# Patient Record
Sex: MALE | Race: WHITE | NOT HISPANIC OR LATINO | Employment: UNEMPLOYED | URBAN - METROPOLITAN AREA
[De-identification: names, ages, dates, MRNs, and addresses within clinical notes are randomized per-mention and may not be internally consistent; named-entity substitution may affect disease eponyms.]

---

## 2023-12-13 ENCOUNTER — APPOINTMENT (OUTPATIENT)
Dept: RADIOLOGY | Facility: HOSPITAL | Age: 58
DRG: 101 | End: 2023-12-13

## 2023-12-13 ENCOUNTER — HOSPITAL ENCOUNTER (INPATIENT)
Facility: HOSPITAL | Age: 58
LOS: 1 days | Discharge: HOME | DRG: 101 | End: 2023-12-15
Attending: EMERGENCY MEDICINE | Admitting: INTERNAL MEDICINE

## 2023-12-13 DIAGNOSIS — Z23 TETANUS TOXOID VACCINATION ADMINISTERED AT CURRENT VISIT: ICD-10-CM

## 2023-12-13 DIAGNOSIS — S09.93XA FACIAL INJURY, INITIAL ENCOUNTER: ICD-10-CM

## 2023-12-13 DIAGNOSIS — R56.9 SEIZURE (MULTI): Primary | ICD-10-CM

## 2023-12-13 DIAGNOSIS — R41.82 ALTERED MENTAL STATUS, UNSPECIFIED ALTERED MENTAL STATUS TYPE: ICD-10-CM

## 2023-12-13 DIAGNOSIS — S09.90XA INJURY OF HEAD, INITIAL ENCOUNTER: ICD-10-CM

## 2023-12-13 LAB
ALBUMIN SERPL-MCNC: 4 G/DL (ref 3.5–5)
ALP BLD-CCNC: 112 U/L (ref 35–125)
ALT SERPL-CCNC: 12 U/L (ref 5–40)
AMMONIA PLAS-SCNC: 69 UMOL/L (ref 12–45)
AMPHETAMINES UR QL SCN>1000 NG/ML: NEGATIVE
ANION GAP SERPL CALC-SCNC: 19 MMOL/L
APAP SERPL-MCNC: <5 UG/ML
APPEARANCE UR: CLEAR
AST SERPL-CCNC: 15 U/L (ref 5–40)
BARBITURATES UR QL SCN>300 NG/ML: NEGATIVE
BASOPHILS # BLD AUTO: 0.05 X10*3/UL (ref 0–0.1)
BASOPHILS NFR BLD AUTO: 0.5 %
BENZODIAZ UR QL SCN>300 NG/ML: NEGATIVE
BILIRUB SERPL-MCNC: <0.2 MG/DL (ref 0.1–1.2)
BILIRUB UR STRIP.AUTO-MCNC: NEGATIVE MG/DL
BUN SERPL-MCNC: 12 MG/DL (ref 8–25)
BZE UR QL SCN>300 NG/ML: NEGATIVE
CALCIUM SERPL-MCNC: 9.3 MG/DL (ref 8.5–10.4)
CANNABINOIDS UR QL SCN>50 NG/ML: POSITIVE
CHLORIDE SERPL-SCNC: 100 MMOL/L (ref 97–107)
CK SERPL-CCNC: 118 U/L (ref 24–195)
CO2 SERPL-SCNC: 18 MMOL/L (ref 24–31)
COLOR UR: ABNORMAL
CREAT SERPL-MCNC: 1.2 MG/DL (ref 0.4–1.6)
EOSINOPHIL # BLD AUTO: 0.23 X10*3/UL (ref 0–0.7)
EOSINOPHIL NFR BLD AUTO: 2.4 %
ERYTHROCYTE [DISTWIDTH] IN BLOOD BY AUTOMATED COUNT: 15.2 % (ref 11.5–14.5)
ETHANOL SERPL-MCNC: <0.01 G/DL
FENTANYL+NORFENTANYL UR QL SCN: NEGATIVE
GFR SERPL CREATININE-BSD FRML MDRD: 70 ML/MIN/1.73M*2
GLUCOSE BLD MANUAL STRIP-MCNC: 127 MG/DL (ref 74–99)
GLUCOSE BLD MANUAL STRIP-MCNC: 142 MG/DL (ref 74–99)
GLUCOSE SERPL-MCNC: 189 MG/DL (ref 65–99)
GLUCOSE UR STRIP.AUTO-MCNC: ABNORMAL MG/DL
HCT VFR BLD AUTO: 45.8 % (ref 41–52)
HGB BLD-MCNC: 14.6 G/DL (ref 13.5–17.5)
HOLD SPECIMEN: NORMAL
HYALINE CASTS #/AREA URNS AUTO: ABNORMAL /LPF
IMM GRANULOCYTES # BLD AUTO: 0.04 X10*3/UL (ref 0–0.7)
IMM GRANULOCYTES NFR BLD AUTO: 0.4 % (ref 0–0.9)
KETONES UR STRIP.AUTO-MCNC: NEGATIVE MG/DL
LEUKOCYTE ESTERASE UR QL STRIP.AUTO: NEGATIVE
LYMPHOCYTES # BLD AUTO: 3.31 X10*3/UL (ref 1.2–4.8)
LYMPHOCYTES NFR BLD AUTO: 34.2 %
MCH RBC QN AUTO: 27.9 PG (ref 26–34)
MCHC RBC AUTO-ENTMCNC: 31.9 G/DL (ref 32–36)
MCV RBC AUTO: 88 FL (ref 80–100)
METHADONE UR QL SCN>300 NG/ML: NEGATIVE
MONOCYTES # BLD AUTO: 0.57 X10*3/UL (ref 0.1–1)
MONOCYTES NFR BLD AUTO: 5.9 %
MUCOUS THREADS #/AREA URNS AUTO: ABNORMAL /LPF
NEUTROPHILS # BLD AUTO: 5.48 X10*3/UL (ref 1.2–7.7)
NEUTROPHILS NFR BLD AUTO: 56.6 %
NITRITE UR QL STRIP.AUTO: NEGATIVE
NRBC BLD-RTO: 0 /100 WBCS (ref 0–0)
OPIATES UR QL SCN>300 NG/ML: NEGATIVE
OXYCODONE UR QL: NEGATIVE
PCP UR QL SCN>25 NG/ML: NEGATIVE
PH UR STRIP.AUTO: 6 [PH]
PLATELET # BLD AUTO: 309 X10*3/UL (ref 150–450)
POTASSIUM SERPL-SCNC: 4.5 MMOL/L (ref 3.4–5.1)
PROLACTIN SERPL-MCNC: 11.3 UG/L (ref 2–18)
PROT SERPL-MCNC: 7.1 G/DL (ref 5.9–7.9)
PROT UR STRIP.AUTO-MCNC: ABNORMAL MG/DL
RBC # BLD AUTO: 5.23 X10*6/UL (ref 4.5–5.9)
RBC # UR STRIP.AUTO: ABNORMAL /UL
RBC #/AREA URNS AUTO: ABNORMAL /HPF
SALICYLATES SERPL-MCNC: <0 MG/DL
SODIUM SERPL-SCNC: 137 MMOL/L (ref 133–145)
SP GR UR STRIP.AUTO: 1.02
TROPONIN T SERPL-MCNC: 9 NG/L
UROBILINOGEN UR STRIP.AUTO-MCNC: NORMAL MG/DL
WBC # BLD AUTO: 9.7 X10*3/UL (ref 4.4–11.3)
WBC #/AREA URNS AUTO: ABNORMAL /HPF

## 2023-12-13 PROCEDURE — S4991 NICOTINE PATCH NONLEGEND: HCPCS | Performed by: NURSE PRACTITIONER

## 2023-12-13 PROCEDURE — 80320 DRUG SCREEN QUANTALCOHOLS: CPT | Performed by: EMERGENCY MEDICINE

## 2023-12-13 PROCEDURE — 80143 DRUG ASSAY ACETAMINOPHEN: CPT | Performed by: EMERGENCY MEDICINE

## 2023-12-13 PROCEDURE — 2500000004 HC RX 250 GENERAL PHARMACY W/ HCPCS (ALT 636 FOR OP/ED): Performed by: EMERGENCY MEDICINE

## 2023-12-13 PROCEDURE — 36415 COLL VENOUS BLD VENIPUNCTURE: CPT | Performed by: EMERGENCY MEDICINE

## 2023-12-13 PROCEDURE — 82550 ASSAY OF CK (CPK): CPT | Performed by: EMERGENCY MEDICINE

## 2023-12-13 PROCEDURE — 72125 CT NECK SPINE W/O DYE: CPT

## 2023-12-13 PROCEDURE — 82947 ASSAY GLUCOSE BLOOD QUANT: CPT

## 2023-12-13 PROCEDURE — 84146 ASSAY OF PROLACTIN: CPT | Mod: WESLAB | Performed by: EMERGENCY MEDICINE

## 2023-12-13 PROCEDURE — 81001 URINALYSIS AUTO W/SCOPE: CPT | Performed by: EMERGENCY MEDICINE

## 2023-12-13 PROCEDURE — 99285 EMERGENCY DEPT VISIT HI MDM: CPT | Performed by: EMERGENCY MEDICINE

## 2023-12-13 PROCEDURE — 70486 CT MAXILLOFACIAL W/O DYE: CPT

## 2023-12-13 PROCEDURE — 82140 ASSAY OF AMMONIA: CPT | Performed by: EMERGENCY MEDICINE

## 2023-12-13 PROCEDURE — 85025 COMPLETE CBC W/AUTO DIFF WBC: CPT | Performed by: EMERGENCY MEDICINE

## 2023-12-13 PROCEDURE — G0378 HOSPITAL OBSERVATION PER HR: HCPCS

## 2023-12-13 PROCEDURE — 71045 X-RAY EXAM CHEST 1 VIEW: CPT

## 2023-12-13 PROCEDURE — 83880 ASSAY OF NATRIURETIC PEPTIDE: CPT | Mod: WESLAB | Performed by: INTERNAL MEDICINE

## 2023-12-13 PROCEDURE — 70450 CT HEAD/BRAIN W/O DYE: CPT

## 2023-12-13 PROCEDURE — 2500000004 HC RX 250 GENERAL PHARMACY W/ HCPCS (ALT 636 FOR OP/ED): Performed by: NURSE PRACTITIONER

## 2023-12-13 PROCEDURE — 84484 ASSAY OF TROPONIN QUANT: CPT

## 2023-12-13 PROCEDURE — 2500000002 HC RX 250 W HCPCS SELF ADMINISTERED DRUGS (ALT 637 FOR MEDICARE OP, ALT 636 FOR OP/ED): Performed by: NURSE PRACTITIONER

## 2023-12-13 PROCEDURE — 80053 COMPREHEN METABOLIC PANEL: CPT | Performed by: EMERGENCY MEDICINE

## 2023-12-13 PROCEDURE — 80307 DRUG TEST PRSMV CHEM ANLYZR: CPT | Performed by: EMERGENCY MEDICINE

## 2023-12-13 RX ORDER — ONDANSETRON HYDROCHLORIDE 2 MG/ML
4 INJECTION, SOLUTION INTRAVENOUS EVERY 8 HOURS PRN
Status: DISCONTINUED | OUTPATIENT
Start: 2023-12-13 | End: 2023-12-15 | Stop reason: HOSPADM

## 2023-12-13 RX ORDER — ACETAMINOPHEN 650 MG/1
650 SUPPOSITORY RECTAL EVERY 4 HOURS PRN
Status: DISCONTINUED | OUTPATIENT
Start: 2023-12-13 | End: 2023-12-15 | Stop reason: HOSPADM

## 2023-12-13 RX ORDER — LEVETIRACETAM 10 MG/ML
1000 INJECTION INTRAVASCULAR EVERY 12 HOURS
Status: DISCONTINUED | OUTPATIENT
Start: 2023-12-13 | End: 2023-12-15

## 2023-12-13 RX ORDER — IPRATROPIUM BROMIDE AND ALBUTEROL SULFATE 2.5; .5 MG/3ML; MG/3ML
3 SOLUTION RESPIRATORY (INHALATION) 2 TIMES DAILY PRN
Status: DISCONTINUED | OUTPATIENT
Start: 2023-12-13 | End: 2023-12-15 | Stop reason: HOSPADM

## 2023-12-13 RX ORDER — LORAZEPAM 2 MG/ML
2 INJECTION INTRAMUSCULAR EVERY 4 HOURS PRN
Status: DISCONTINUED | OUTPATIENT
Start: 2023-12-13 | End: 2023-12-15 | Stop reason: HOSPADM

## 2023-12-13 RX ORDER — ONDANSETRON 4 MG/1
4 TABLET, FILM COATED ORAL EVERY 8 HOURS PRN
Status: DISCONTINUED | OUTPATIENT
Start: 2023-12-13 | End: 2023-12-15 | Stop reason: HOSPADM

## 2023-12-13 RX ORDER — LORAZEPAM 2 MG/ML
2 INJECTION INTRAMUSCULAR ONCE
Status: COMPLETED | OUTPATIENT
Start: 2023-12-13 | End: 2023-12-13

## 2023-12-13 RX ORDER — BUDESONIDE 0.5 MG/2ML
0.5 INHALANT ORAL
Status: DISCONTINUED | OUTPATIENT
Start: 2023-12-13 | End: 2023-12-13

## 2023-12-13 RX ORDER — BISACODYL 5 MG
10 TABLET, DELAYED RELEASE (ENTERIC COATED) ORAL DAILY PRN
Status: DISCONTINUED | OUTPATIENT
Start: 2023-12-13 | End: 2023-12-15 | Stop reason: HOSPADM

## 2023-12-13 RX ORDER — ACETAMINOPHEN 160 MG/5ML
650 SOLUTION ORAL EVERY 4 HOURS PRN
Status: DISCONTINUED | OUTPATIENT
Start: 2023-12-13 | End: 2023-12-15 | Stop reason: HOSPADM

## 2023-12-13 RX ORDER — ACETAMINOPHEN 325 MG/1
650 TABLET ORAL EVERY 4 HOURS PRN
Status: DISCONTINUED | OUTPATIENT
Start: 2023-12-13 | End: 2023-12-15 | Stop reason: HOSPADM

## 2023-12-13 RX ORDER — FAMOTIDINE 20 MG/1
20 TABLET, FILM COATED ORAL 2 TIMES DAILY
COMMUNITY
Start: 2023-01-31 | End: 2023-12-13 | Stop reason: ENTERED-IN-ERROR

## 2023-12-13 RX ORDER — IBUPROFEN 200 MG
1 TABLET ORAL DAILY
Status: DISCONTINUED | OUTPATIENT
Start: 2023-12-13 | End: 2023-12-15 | Stop reason: HOSPADM

## 2023-12-13 RX ORDER — IPRATROPIUM BROMIDE AND ALBUTEROL SULFATE 2.5; .5 MG/3ML; MG/3ML
3 SOLUTION RESPIRATORY (INHALATION)
Status: DISCONTINUED | OUTPATIENT
Start: 2023-12-13 | End: 2023-12-13

## 2023-12-13 RX ORDER — ACETAMINOPHEN 325 MG/1
650 TABLET ORAL ONCE
Status: COMPLETED | OUTPATIENT
Start: 2023-12-13 | End: 2023-12-13

## 2023-12-13 RX ADMIN — LEVETIRACETAM 1000 MG: 10 INJECTION INTRAVENOUS at 16:14

## 2023-12-13 RX ADMIN — Medication 1 PATCH: at 16:14

## 2023-12-13 RX ADMIN — ACETAMINOPHEN 650 MG: 325 TABLET ORAL at 06:54

## 2023-12-13 RX ADMIN — LORAZEPAM 2 MG: 2 INJECTION, SOLUTION INTRAMUSCULAR; INTRAVENOUS at 12:49

## 2023-12-13 RX ADMIN — SODIUM CHLORIDE 500 ML: 900 INJECTION, SOLUTION INTRAVENOUS at 05:48

## 2023-12-13 ASSESSMENT — ENCOUNTER SYMPTOMS
CONSTIPATION: 0
ABDOMINAL PAIN: 0
DYSURIA: 0
DIARRHEA: 0
FATIGUE: 0
DIZZINESS: 0
SHORTNESS OF BREATH: 0
ABDOMINAL DISTENTION: 0
LIGHT-HEADEDNESS: 0
APPETITE CHANGE: 0
RHINORRHEA: 0
CHEST TIGHTNESS: 0
PALPITATIONS: 0
FEVER: 0
NUMBNESS: 0
VOMITING: 0
COUGH: 0
NAUSEA: 0

## 2023-12-13 ASSESSMENT — LIFESTYLE VARIABLES
REASON UNABLE TO ASSESS: NO
EVER FELT BAD OR GUILTY ABOUT YOUR DRINKING: NO
HAVE YOU EVER FELT YOU SHOULD CUT DOWN ON YOUR DRINKING: NO
EVER HAD A DRINK FIRST THING IN THE MORNING TO STEADY YOUR NERVES TO GET RID OF A HANGOVER: NO
HAVE PEOPLE ANNOYED YOU BY CRITICIZING YOUR DRINKING: NO

## 2023-12-13 ASSESSMENT — PAIN SCALES - GENERAL
PAINLEVEL_OUTOF10: 0 - NO PAIN
PAINLEVEL_OUTOF10: 0 - NO PAIN

## 2023-12-13 ASSESSMENT — COLUMBIA-SUICIDE SEVERITY RATING SCALE - C-SSRS
1. IN THE PAST MONTH, HAVE YOU WISHED YOU WERE DEAD OR WISHED YOU COULD GO TO SLEEP AND NOT WAKE UP?: NO
6. HAVE YOU EVER DONE ANYTHING, STARTED TO DO ANYTHING, OR PREPARED TO DO ANYTHING TO END YOUR LIFE?: NO
2. HAVE YOU ACTUALLY HAD ANY THOUGHTS OF KILLING YOURSELF?: NO

## 2023-12-13 ASSESSMENT — PAIN DESCRIPTION - PROGRESSION: CLINICAL_PROGRESSION: NOT CHANGED

## 2023-12-13 ASSESSMENT — PAIN - FUNCTIONAL ASSESSMENT: PAIN_FUNCTIONAL_ASSESSMENT: 0-10

## 2023-12-13 NOTE — ED PROVIDER NOTES
HPI   Chief Complaint   Patient presents with    Seizures     Pt presents to ED after seizure at home, roommates state that they were drinking their morning coffee when he fel onto carpet and started having full body convulsions. Pt reportedly had one seizure about a year ago, not on seizure meds. Pt hit head off carpet, has abrasions to L face from carpet, not on blood thinners. Pt post ictal on arrival, A&Ox1-2.        58-year-old male with no significant past medical history is brought to the emergency department by EMS after an episode concerning for seizure.  The patient was getting breakfast before going to work and he had a witnessed episode where he became tonic fell forward striking his face then had clonic activity for an unknown known amount of time and then was difficult to arouse.  911 was called and when EMS arrived the patient was postictal and has been improving during transport.  Currently the patient is alert and oriented, but slow to respond and is not showing any signs of focal neurological deficit.  He states that he does not have a seizure history and no medications.  He denies alcohol abuse or previous withdrawal seizures.  He has not had any viral illnesses or recent travel.  Witnesses stated that he had a similar episode 1 year ago, but the patient states that nothing came of it.  He also denies any illegal drugs and smokes cigarettes.      History provided by:  Patient and EMS personnel   used: No                        Dayana Coma Scale Score: 14         NIH Stroke Scale: 1          Patient History   Past Medical History:   Diagnosis Date    Seizures (CMS/HCC)      Past Surgical History:   Procedure Laterality Date    SHOULDER SURGERY Left      No family history on file.  Social History     Tobacco Use    Smoking status: Every Day     Types: Cigarettes    Smokeless tobacco: Never   Substance Use Topics    Alcohol use: Never    Drug use: Yes     Types: Marijuana        Physical Exam   ED Triage Vitals [12/13/23 0536]   Temp Heart Rate Resp BP   36.3 °C (97.3 °F) 79 20 152/88      SpO2 Temp Source Heart Rate Source Patient Position   96 % Oral Monitor --      BP Location FiO2 (%)     -- --       Physical Exam  Vitals and nursing note reviewed.   Constitutional:       General: He is not in acute distress.     Appearance: He is well-developed and normal weight. He is ill-appearing and toxic-appearing. He is not diaphoretic.   HENT:      Head: Normocephalic.      Nose: Nose normal.      Mouth/Throat:      Mouth: Mucous membranes are moist.   Eyes:      General: No scleral icterus.     Extraocular Movements: Extraocular movements intact.      Conjunctiva/sclera: Conjunctivae normal.      Pupils: Pupils are equal, round, and reactive to light.   Cardiovascular:      Rate and Rhythm: Normal rate and regular rhythm.      Heart sounds: No murmur heard.  Pulmonary:      Effort: Pulmonary effort is normal. No respiratory distress.      Breath sounds: Normal breath sounds.   Abdominal:      Palpations: Abdomen is soft.      Tenderness: There is no abdominal tenderness.   Musculoskeletal:         General: No swelling, tenderness, deformity or signs of injury.      Cervical back: Normal range of motion and neck supple. No tenderness.   Skin:     General: Skin is warm and dry.      Capillary Refill: Capillary refill takes less than 2 seconds.   Neurological:      General: No focal deficit present.      Mental Status: He is alert.   Psychiatric:         Mood and Affect: Mood normal.         ED Course & MDM   ED Course as of 12/14/23 0712   Wed Dec 13, 2023   0549 Vital signs within normal limits and he is not febrile or hypothermic, tachycardic or tachypneic.  Blood pressure minimally elevated [EG]   0550 ECG performed on December 13, 2023 at 5:38 AM and interpreted at 5:40 AM shows NSR, NAD, intervals within normal limits, no STEMI. No previous available for comparison [EG]   0647  Ammonia(!): 69 [JJ]   0802 Labs reviewed and the patient has a mildly raised ammonia level, normal CMP without evidence of hepatitis or hyperbilirubinemia.  Prolactin is taken but will not likely result today.  Creatinine kinase is low.  This is not supporting the diagnosis of seizure.  Patient has not had any other seizure-like episodes while in the emergency department [EG]   0940 EKG on my independent review: Sinus bradycardia at 54 bpm, normal axis, first-degree AV block otherwise normal intervals, no acute ST or T wave abnormalities [SHREYAS]   0949 Nursing staff approached me with concern because patient became acutely diaphoretic.  EKG was obtained chest x-ray will be performed awaiting troponin and BNP to evaluate for any cardiac etiology. [JJ]   1104 Ammonia(!): 69 [JJ]   1206 I spoke with neurologist on-call Dr. Chavez.  We discussed the patient case.  She recommends outpatient follow-up at the [facility] or [facility] given the patient does not have insurance and to prescribe the patient levetiracetam 500 mg twice daily for 30 days with 2 refills to cover him for the time being. [JJ]   1249 Went in to speak with patient regarding his disposition and patient was unresponsive, started seizing while I was in the room.  Blood glucose 142.  Will contact hospitalist for admission. [JJ]      ED Course User Index  [EG] Roxanne Rodríguez MD  [JJ] Maile Simmons PA-C  [SHREYAS] Gabrielle Brooke MD         Diagnoses as of 12/14/23 0712   Facial injury, initial encounter   Altered mental status, unspecified altered mental status type   Seizure (CMS/HCC)   Injury of head, initial encounter   Tetanus toxoid vaccination administered at current visit       Medical Decision Making  I personally saw and evaluated the patient and performed a substantive portion of the visit including all aspects of the medical decision making. All diagnostic, treatment, and disposition decisions were made by me in conjunction with  the NURIS as noted in the medical record.      HPI:  As Above  PMHx/PSHx/Meds/Allergies/SH/FH as per nursing documentation and reviewed.  Review of systems: Total of 10 systems reviewed and otherwise negative except as noted elsewhere    DDX: As described in MDM    If performed, radiology listed above interpreted by me and confirmed by the Radiologist.  Medications administered during this visit (name and route): see MAR  Social determinants of health considered for this visit: Lives at home  If performed, EKG interpreted by me and detailed above    [facility] Summary/considerations:  58-year-old male presenting with altered mental status after a witnessed episode consistent with tonic clonic seizure with head injury.  As the patient is still slow but oriented to person place and time and has not returned to baseline with head injury he is being evaluated with a CT head, cervical spine and maxillofacial bones as there is evidence of trauma to the left side of his face.  Currently he denies any pain or other symptoms.  He denies that he has had any recent nausea vomiting diarrhea or constipation.  He denies a history of alcohol abuse.  The differential is very broad and included electrolyte disturbance, alcohol withdrawal, substance abuse, epilepsy, etc.  ECG is not showing any significant interval derangements or arrhythmias.      The patient was seen and triaged by our nursing/medic staff, their vitals were taken and the staff notes were reviewed.  If the patient arrived by an EMS squad or an outside agency, we discussed the case with transporting EMS medic, police, or other historians. My initial assessment was attention to their airway, breathing, and circulatory status.  We addressed any immediate or life threatening findings and completed a medical history and a physical exam if the patient or those legally responsible were in agreement with this.   Prior to the patient being discharged, I or my PA/NP or the nursing  staff discussed the differential, results and discharge plan with the patient and/or family/friend/caregiver if present.  I emphasized the importance of follow-up in 2-3 days unless otherwise specified.  I explained reasons for the patient to return to the Emergency Department. Additional verbal discharge instructions were also given and discussed with the patient to supplement those generated by the EMR. We also discussed medications that were prescribed (if any) including common side effects and interactions. The patient was advised to abstain from driving, operating heavy machinery or making significant decisions while taking medications such as antihistamines, benzodiazepines, opiates and muscle relaxers. All questions were addressed.  They understand return precautions and discharge instructions. The patient and/or family/friend/caregiver expressed understanding.  **Disclaimer:  This note was dictated by speech recognition technology.  Minor errors in transcription may be present.  Please contact for clarification or corrections.    In the case the patient eloped or refused treatment/admission, we offered to the best of our ability to provide care to the patient at the time of this encounter.    Amount and/or Complexity of Data Reviewed  Labs: ordered.  Radiology: ordered.  ECG/medicine tests: ordered and independent interpretation performed. Decision-making details documented in ED Course.        Procedure  Procedures     Roxanne Rodríguez MD  12/13/23 1639       Roxanne Rodríguez MD  12/14/23 3039

## 2023-12-13 NOTE — ED PROVIDER NOTES
HPI   Chief Complaint   Patient presents with    Seizures     Pt presents to ED after seizure at home, roommates state that they were drinking their morning coffee when he fel onto carpet and started having full body convulsions. Pt reportedly had one seizure about a year ago, not on seizure meds. Pt hit head off carpet, has abrasions to L face from carpet, not on blood thinners. Pt post ictal on arrival, A&Ox1-2.        Patient is a 58-year-old male accompanied by his brother presenting for evaluation of episode concerning for a seizure that occurred this morning.  Patient is postictal therefore history was provided by the brother.  Patient's brother states they live together and the patient was sitting in a chair awaiting his morning coffee when his brother noticed him slumped over and hit his face on the ground he then started jerking.  Patient does not remember the event.  Patient states he did have a seizure once before a few months ago but he does not have insurance therefore he did not follow-up with a neurologist.  He is not on any seizure medications at this time.  Patient denies recent illness, fever, chills.  Denies any other injuries.  Patient smokes but denies alcohol use.  Denies drug use.  Patient has no other acute complaints at this time.  Patient unsure of whether his tetanus shot is updated or not.                        Dayana Coma Scale Score: 14         NIH Stroke Scale: 1          Patient History   Past Medical History:   Diagnosis Date    Seizures (CMS/HCC)      Past Surgical History:   Procedure Laterality Date    SHOULDER SURGERY Left      No family history on file.  Social History     Tobacco Use    Smoking status: Every Day     Types: Cigarettes    Smokeless tobacco: Never   Substance Use Topics    Alcohol use: Never    Drug use: Yes     Types: Marijuana       Physical Exam   ED Triage Vitals [12/13/23 0536]   Temp Heart Rate Resp BP   36.3 °C (97.3 °F) 79 20 152/88      SpO2 Temp Source  Heart Rate Source Patient Position   96 % Oral Monitor --      BP Location FiO2 (%)     -- --       Physical Exam  Vitals and nursing note reviewed.   Constitutional:       Comments: Awake but slow to respond, postictal.   HENT:      Head:      Comments: Abrasions to the chin and left maxillofacial and periorbital area.  Nontender to palpation.  Cardiovascular:      Rate and Rhythm: Normal rate and regular rhythm.      Heart sounds: Normal heart sounds.   Pulmonary:      Comments: Diminished lung sounds bilaterally.  Abdominal:      General: Abdomen is flat. There is no distension.      Palpations: Abdomen is soft.      Tenderness: There is no abdominal tenderness.   Skin:     General: Skin is warm and dry.   Neurological:      Comments: Patient is oriented to person and place, slow to respond.   Psychiatric:         Mood and Affect: Mood normal.         Behavior: Behavior normal.         ED Course & MDM   ED Course as of 12/13/23 1343   Wed Dec 13, 2023   0549 Vital signs within normal limits and he is not febrile or hypothermic, tachycardic or tachypneic.  Blood pressure minimally elevated [EG]   0550 ECG performed on December 13, 2023 at 5:38 AM and interpreted at 5:40 AM shows NSR, NAD, intervals within normal limits, no STEMI. No previous available for comparison [EG]   0647 Ammonia(!): 69 [JJ]   0802 Labs reviewed and the patient has a mildly raised ammonia level, normal CMP without evidence of hepatitis or hyperbilirubinemia.  Prolactin is taken but will not likely result today.  Creatinine kinase is low.  This is not supporting the diagnosis of seizure.  Patient has not had any other seizure-like episodes while in the emergency department [EG]   0940 EKG on my independent review: Sinus bradycardia at 54 bpm, normal axis, first-degree AV block otherwise normal intervals, no acute ST or T wave abnormalities [SHREYAS]   0949 Nursing staff approached me with concern because patient became acutely diaphoretic.  EKG  was obtained chest x-ray will be performed awaiting troponin and BNP to evaluate for any cardiac etiology. [JJ]   1104 Ammonia(!): 69 [JJ]   1206 I spoke with neurologist on-call Dr. Chavez.  We discussed the patient case.  She recommends outpatient follow-up at the [facility] or [facility] given the patient does not have insurance and to prescribe the patient levetiracetam 500 mg twice daily for 30 days with 2 refills to cover him for the time being. [JJ]   1249 Went in to speak with patient regarding his disposition and patient was unresponsive, started seizing while I was in the room.  Blood glucose 142.  Will contact hospitalist for admission. [JJ]      ED Course User Index  [EG] Roxanne Rodríguez MD  [JJ] Maile Simmons PA-C  [SHREYAS] Gabrielle Brooke MD         Diagnoses as of 12/13/23 1343   Facial injury, initial encounter   Altered mental status, unspecified altered mental status type   Seizure (CMS/HCC)   Injury of head, initial encounter   Tetanus toxoid vaccination administered at current visit       Medical Decision Making  Parts of this chart have been completed using voice recognition software. Please excuse any errors of transcription.  My thought process and reason for plan has been formulated from the time that I saw the patient until the time of disposition and is not specific to one specific moment during their visit and furthermore my MDM encompasses this entire chart and not only this text box.      HPI: Detailed above.    Exam: A medically appropriate exam performed, outlined above, given the known history and presentation.    History obtained from: Patient, brother    Social Determinants of Health considered during this visit: Does not have health insurance.    Medications given during visit:  Medications   diphth,pertus(acell),tetanus (BoostRIX) 2.5-8-5 Lf-mcg-Lf/0.5mL vaccine 0.5 mL (has no administration in time range)   acetaminophen (Tylenol) tablet 650 mg (650 mg oral  Given 12/13/23 0654)   sodium chloride 0.9 % bolus 500 mL (0 mL intravenous Stopped 12/13/23 0648)   LORazepam (Ativan) injection 2 mg (2 mg intravenous Given 12/13/23 1240)        Diagnostic/tests  Labs Reviewed   CBC WITH AUTO DIFFERENTIAL - Abnormal       Result Value    WBC 9.7      nRBC 0.0      RBC 5.23      Hemoglobin 14.6      Hematocrit 45.8      MCV 88      MCH 27.9      MCHC 31.9 (*)     RDW 15.2 (*)     Platelets 309      Neutrophils % 56.6      Immature Granulocytes %, Automated 0.4      Lymphocytes % 34.2      Monocytes % 5.9      Eosinophils % 2.4      Basophils % 0.5      Neutrophils Absolute 5.48      Immature Granulocytes Absolute, Automated 0.04      Lymphocytes Absolute 3.31      Monocytes Absolute 0.57      Eosinophils Absolute 0.23      Basophils Absolute 0.05     COMPREHENSIVE METABOLIC PANEL - Abnormal    Glucose 189 (*)     Sodium 137      Potassium 4.5      Chloride 100      Bicarbonate 18 (*)     Urea Nitrogen 12      Creatinine 1.20      eGFR 70      Calcium 9.3      Albumin 4.0      Alkaline Phosphatase 112      Total Protein 7.1      AST 15      Bilirubin, Total <0.2      ALT 12      Anion Gap 19     DRUG SCREEN,URINE - Abnormal    Amphetamine Screen, Urine Negative      Barbiturate Screen, Urine Negative      Benzodiazepines Screen, Urine Negative      Cannabinoid Screen, Urine Positive (*)     Cocaine Metabolite Screen, Urine Negative      Fentanyl Screen, Urine Negative      Methadone Screen, Urine Negative      Opiate Screen, Urine Negative      Oxycodone Screen, Urine Negative      PCP Screen, Urine Negative      Narrative:     These toxicological screening tests provide unconfirmed qualitative measurements to aid in treatment and diagnosis in cases of drug use or overdose. This test is used only for medical purposes. A positive result does not indicate or measure intoxication. For specific test performance or pathologist consultation, please contact the Laboratory.    The  following threshold concentrations are used for these analyses.Values at or above the threshold concentration are reported as positive. Values below the threshold are reported as negative.    Drug /Screening Threshold                                                                                                 THC/CANNABINOIDS................50 ng/ml  METHADONE......................300 ng/ml  COCAINE METABOLITES............300 ng/ml  BENZODIAZEPINE.................300 ng/ml  PCP.............................25 ng/ml  OPIATE.........................300 ng/ml  AMPHETAMINE/ECSTASY...........1000 ng/ml  BARBITURATE....................200 ng/ml  OXYCODONE......................100 ng/ml  FENTANYL.........................5 ng/ml       AMMONIA - Abnormal    Ammonia 69 (*)    URINALYSIS WITH REFLEX MICROSCOPIC AND CULTURE - Abnormal    Color, Urine Light-Yellow      Appearance, Urine Clear      Specific Gravity, Urine 1.020      pH, Urine 6.0      Protein, Urine 20 (TRACE)      Glucose, Urine 50 (TRACE) (*)     Blood, Urine 0.06 (1+) (*)     Ketones, Urine NEGATIVE      Bilirubin, Urine NEGATIVE      Urobilinogen, Urine Normal      Nitrite, Urine NEGATIVE      Leukocyte Esterase, Urine NEGATIVE     POCT GLUCOSE - Abnormal    POCT Glucose 127 (*)    POCT GLUCOSE - Abnormal    POCT Glucose 142 (*)    URINALYSIS MICROSCOPIC WITH REFLEX CULTURE - Abnormal    WBC, Urine NONE      RBC, Urine 1-2      Mucus, Urine FEW      Hyaline Casts, Urine 1+ (*)    CREATINE KINASE - Normal    Creatine Kinase 118     PROLACTIN - Normal    Prolactin 11.3     ACUTE TOXICOLOGY PANEL, BLOOD - Normal    Acetaminophen <5.0      Salicylate  <0      Alcohol <0.010     SERIAL TROPONIN, INITIAL ([facility]) - Normal    Troponin T, High Sensitivity 9     URINALYSIS WITH REFLEX MICROSCOPIC AND CULTURE    Narrative:     The following orders were created for panel order Urinalysis with Reflex Microscopic and Culture.  Procedure                                Abnormality         Status                     ---------                               -----------         ------                     Urinalysis with Reflex M...[[id number]]  Abnormal            Final result               Extra Urine Gray Tube[[id number]]                            Final result                 Please view results for these tests on the individual orders.   EXTRA URINE GRAY TUBE    Extra Tube Hold for add-ons.     TROPONIN T SERIES, HIGH SENSITIVITY (0, 2 HR, 6 HR)    Narrative:     The following orders were created for panel order Troponin T Series, High Sensitivity (0, 2HR, 6HR).  Procedure                               Abnormality         Status                     ---------                               -----------         ------                     Serial Troponin, Initial...[[id number]]  Normal              Final result                 Please view results for these tests on the individual orders.   PROLACTIN   N-TERMINAL PROBNP      XR chest 1 view   Final Result   No evidence of acute cardiopulmonary process.             MACRO:   None        Signed by: Maxim Harmon 12/13/2023 11:17 AM   Dictation workstation:   [id number]      CT maxillofacial bones wo IV contrast   Final Result   No acute facial bone fracture visualized.        MACRO:   None        Signed by: Hernandez Hodges 12/13/2023 8:23 AM   Dictation workstation:   [id number]      CT cervical spine wo IV contrast   Final Result   1. No acute fracture or spondylolisthesis.   2. Degenerative disc disease and spondylosis.        .   .        This report is in agreement with the preliminary report issued by   [employer].        MACRO:   None.        Signed by: Declan Tellez 12/13/2023 7:58 AM   Dictation workstation:   [id number]      CT head wo IV contrast   Final Result   No acute intracranial findings.        MACRO:   None        Signed by: Declan Tellez 12/13/2023 7:44 AM   Dictation workstation:   [id number]            Considerations/further MDM:  Patient is a 58-year-old male presenting for evaluation of episode concerning for seizure that occurred this morning.  During the ER visit, the patient is alert and oriented but slow to respond, he is postictal during the visit but this has improved throughout the course.  His vital signs are within normal limits for age.  On exam, he does have abrasions to his chin and left side of his face.  Patient has no recollection of the event but brother was able to provide history as he did witness the event.  Patient did hit his face and head not on blood thinners, CT head, C-spine and maxillofacial bones were obtained to evaluate for any bleed, occult fracture.  Images were unremarkable.  Laboratory workup was unremarkable for any signs of infection, no acute alcohol or drug intoxication.  Patient did become diaphoretic and was requiring 3 L of oxygen therefore cardiac workup was initiated, chest x-ray within normal limits troponins unremarkable.  Low suspicion for ACS at this time as the patient is not complaining of chest pain or shortness of breath.  This did resolve within a couple minutes and the patient's mental status was improving.  I consulted neurology whose recommendations including started levetiracetam and follow-up outpatient with the [facility] but when I went in to update the patient on the disposition, the patient was sitting upright but unresponsive with his eyes open and he began to seize.  Ativan was administered.  Blood glucose was checked again and was 142.  Thus I believe the patient requires additional workup for evaluation of his seizures.  I spoke with hospitalist on-call Dr. Rodriguez and we discussed the patient case.  Patient will be admitted to regular nursing floor with telemetry.  The patient's brother is in the room and is updated on the plan is agreeable at this time.      Procedure  Procedures     Maile Simmons PA-C  12/13/23 1951

## 2023-12-13 NOTE — H&P
Chief complaint: Fall, seizure    History Of Present Illness  Aron Colón is a 58 y.o. male with a past medical history of tobacco use and seizure last year who presented to the emergency department with complaints of a seizure.  Patient states he remembers sitting down having a cup of coffee and waking up in the emergency department.  States that he did have a seizure about a year ago.  Per ED note, patient did not follow-up with neurology due to having no insurance.  History per the chart states that patient was getting breakfast before going to work when he had a witnessed seizure.  He became tonic and fell forward striking his face then had clonic activity for an unknown period of time.  He was difficult to arouse.  911 was called and patient was brought to the emergency department.  Patient was postictal in the ED.  Per ED RN, patient had 2 more seizures in the emergency department.  During the seizure he did desat into the 80s and was placed on supplemental oxygen.  When he is awake his oxygen is 95% on room air.  Patient denies shortness of breath, nausea, vomiting, or abdominal discomfort.  No chest pain.  Patient initially wanting to leave AMA however explained the risks associated with seizures without treatment and patient was agreeable to stay.    In the ED: Patient was given IV Ativan.  CBC and BMP were unremarkable.  His ammonia level was 69.  CT of the head, C-spine, and face were unremarkable.  UA negative.  Urine tox screen was positive for THC.  Alcohol negative.  EKG showed normal sinus rhythm.  His troponin was 9.  He was given a tetanus shot in the ED, Tylenol, and IV fluids.  Patient admitted to [facility] for further evaluation and treatment.     Past Medical History  He has a past medical history of Seizures (CMS/HCC).    Surgical History  He has a past surgical history that includes Shoulder surgery (Left).     Social History  He reports that he has been smoking cigarettes. He has  never used smokeless tobacco. He reports current drug use. Drug: Marijuana. He reports that he does not drink alcohol.    Family History  No family history on file.     Allergies  Patient has no allergy information on record.    Review of Systems   Constitutional:  Negative for appetite change, fatigue and fever.   HENT:  Negative for congestion and rhinorrhea.    Respiratory:  Negative for cough, chest tightness and shortness of breath.    Cardiovascular:  Negative for chest pain and palpitations.   Gastrointestinal:  Negative for abdominal distention, abdominal pain, constipation, diarrhea, nausea and vomiting.   Genitourinary:  Negative for dysuria and urgency.   Neurological:  Negative for dizziness, light-headedness and numbness.   All other systems reviewed and are negative.       Physical Exam  Vitals reviewed.   Constitutional:       Appearance: Normal appearance.      Comments: Drowsy, limited historian   HENT:      Head: Normocephalic.      Comments: Noted erythema, swelling, and ecchymosis to face     Nose: Nose normal.      Mouth/Throat:      Mouth: Mucous membranes are moist.   Eyes:      Extraocular Movements: Extraocular movements intact.      Conjunctiva/sclera: Conjunctivae normal.   Cardiovascular:      Rate and Rhythm: Normal rate and regular rhythm.   Pulmonary:      Effort: Pulmonary effort is normal.      Breath sounds: Wheezing present. No rhonchi or rales.   Abdominal:      General: Bowel sounds are normal.      Palpations: Abdomen is soft.      Tenderness: There is no abdominal tenderness.   Musculoskeletal:         General: Normal range of motion.      Cervical back: Normal range of motion and neck supple.   Skin:     General: Skin is warm and dry.      Capillary Refill: Capillary refill takes less than 2 seconds.   Neurological:      General: No focal deficit present.      Mental Status: He is oriented to person, place, and time.   Psychiatric:         Mood and Affect: Mood normal.          Behavior: Behavior normal.          Last Recorded Vitals  /67   Pulse 79   Temp 36.3 °C (97.3 °F) (Oral)   Resp 23   Wt 69.9 kg (154 lb 1.6 oz)   SpO2 96%     Relevant Results  Lab Results   Component Value Date    GLUCOSE 189 (H) 12/13/2023    CALCIUM 9.3 12/13/2023     12/13/2023    K 4.5 12/13/2023    CO2 18 (L) 12/13/2023     12/13/2023    BUN 12 12/13/2023    CREATININE 1.20 12/13/2023     Lab Results   Component Value Date    WBC 9.7 12/13/2023    HGB 14.6 12/13/2023    HCT 45.8 12/13/2023    MCV 88 12/13/2023     12/13/2023     XR chest 1 view    Result Date: 12/13/2023  Interpreted By:  Maxim Harmon, STUDY: XR CHEST 1 VIEW;  12/13/2023 10:11 am   INDICATION: Signs/Symptoms:seizure possible aspiration.   COMPARISON: 09/22/2023.   ACCESSION NUMBER(S): [id number]   ORDERING CLINICIAN: ABDOULAYE BATEMAN   FINDINGS:         CARDIOMEDIASTINAL SILHOUETTE: Cardiomediastinal silhouette is normal in size and configuration.   LUNGS: Lungs are clear.   ABDOMEN: No remarkable upper abdominal findings.   BONES: No acute osseous changes.       No evidence of acute cardiopulmonary process.     MACRO: None   Signed by: Maxim Harmon 12/13/2023 11:17 AM Dictation workstation:   [id number]    ECG 12 lead    Result Date: 12/13/2023  Sinus bradycardia with 1st degree AV block Otherwise normal ECG When compared with ECG of 13-DEC-2023 05:38, (unconfirmed) No significant change was found    ECG 12 lead    Result Date: 12/13/2023  Normal sinus rhythm Normal ECG No previous ECGs available    CT maxillofacial bones wo IV contrast    Result Date: 12/13/2023  Interpreted By:  Hernandez Hodges, STUDY: CT FACIAL BONES WO IV CONTRAST  12/13/2023 6:47 am   INDICATION: Signs/Symptoms: Seizure with facial trauma   COMPARISON: None.   ACCESSION NUMBER(S): [id number]   ORDERING CLINICIAN: ALISON VICENTE   TECHNIQUE: Thin cut axial CT images through the facial bones were obtained and reconstructed in the coronal   and sagittal plane.   FINDINGS: Orbits: The bony orbits are intact. The orbital contents are unremarkable.   Facial Bones: There is no displaced facial bone fracture.   Mandible/Temporomandibular Joints: Visualized portions of mandible and bilateral temporomandibular joints are intact.   Paranasal Sinuses/Mastoids: Visualized paranasal sinuses and mastoids are clear. 10.5 mm osteoma in the left ethmoid sinuses.   Soft tissues: Unremarkable.         No acute facial bone fracture visualized.   MACRO: None   Signed by: Hernandez Hodges 12/13/2023 8:23 AM Dictation workstation:   [id number]    CT cervical spine wo IV contrast    Result Date: 12/13/2023  Interpreted By:  Declan Tellez, STUDY: CT CERVICAL SPINE WO IV CONTRAST; 12/13/2023 6:47 am   INDICATION: Signs/Symptoms:neck trauma, AMS;   COMPARISON: None   ACCESSION NUMBER(S): [id number]   ORDERING CLINICIAN: ALISON VICENTE   TECHNIQUE: Contiguous axial images of the cervical spine were performed. PATIENT RADIATION EXPOSURE DATA: CTDI: DLP:   All CT examinations are performed with one or more of the following dose reduction techniques: Automated Exposure Control, adjustment of mA and/or kV according to patient size, or use of iterative reconstruction techniques.   FINDINGS: There is straightening and reversal of the normal cervical lordosis.   No acute fracture or spondylolisthesis is identified.   Multilevel disc space narrowing can be seen. Endplate osteophytosis facet arthropathy is noted.   Severe multilevel neural foraminal narrowing is present.   Calcific atherosclerosis of the carotid bulbs can be seen bilaterally.   Limited visualization of the lung apices are unremarkable.       1. No acute fracture or spondylolisthesis. 2. Degenerative disc disease and spondylosis.   . .   This report is in agreement with the preliminary report issued by [facility].   MACRO: None.   Signed by: Declan Tellez 12/13/2023 7:58 AM Dictation workstation:    [id number]    CT head wo IV contrast    Result Date: 12/13/2023  Interpreted By:  Declan Tellez, STUDY: CT HEAD WO IV CONTRAST; 12/13/2023 6:47 am   INDICATION: Signs/Symptoms:head injury after seizure;   COMPARISON: 09/22/2023   ACCESSION NUMBER(S): [id number]   ORDERING CLINICIAN: ALISON VICENTE   TECHNIQUE: Contiguous axial images were acquired from the vertex through the posterior fossa without IV contrast.   All CT examinations are performed with one or more of the following dose reduction techniques: Automated Exposure Control, adjustment of mA and/or kV according to patient size, or use of iterative reconstruction techniques.   FINDINGS: No focal mass effect or midline shift is identified. The ventricles and sulci are symmetric and appropriate for the patient's age. However, chronic involutional change of the cerebral hemispheres is noted.   The gray white matter differentiation is preserved. Nonspecific hypodensities are identified within the periventricular and subcortical white matter likely due to chronic postischemic white matter disease. Atherosclerotic calcifications are seen within the carotid siphons and vertebral arteries.   No acute intracranial hemorrhage is identified. No intra-axial or extra-axial fluid collection is seen.   Osteoma is noted within the left ethmoidal air cells. The mastoid air cells and middle ears are clear. No calvarial fracture is identified.       No acute intracranial findings.   MACRO: None   Signed by: Delcan Tellez 12/13/2023 7:44 AM Dictation workstation:   [id number]        Assessment/Plan   Principal Problem:    Seizure (CMS/HCC)    Seizures  Patient has had 3 seizures in the last 24 hours  Does have a history of a seizure about a year ago that was never worked up  CT of the head, C-spine, and face unremarkable  Talk screen positive for THC  Ammonia level 69  Seizure precautions  IV Keppra  As needed Ativan  MRI of the brain  EEG  Consult neurology,  appreciate recommendations    Fall  Secondary to seizure  Fall precautions  PT/OT    Tobacco use  Notable wheezing on exam  DuoNebs and Pulmicort  Nicotine patch  Smoking cessation    Plan  Admit to telemetry  IV Keppra  As needed Ativan  Seizure/fall precautions  MRI/EEG  Neurology consulted, appreciate recommendations  DVT risk score low, ambulate with assistance  CBC and BMP in the morning       NEWTON Corona-CNP  Seen and examined. Agree with Celeste Coleman NP

## 2023-12-14 ENCOUNTER — APPOINTMENT (OUTPATIENT)
Dept: NEUROLOGY | Facility: HOSPITAL | Age: 58
DRG: 101 | End: 2023-12-14

## 2023-12-14 LAB
ANION GAP SERPL CALC-SCNC: 12 MMOL/L
ATRIAL RATE: 54 BPM
ATRIAL RATE: 79 BPM
BUN SERPL-MCNC: 12 MG/DL (ref 8–25)
CALCIUM SERPL-MCNC: 8.9 MG/DL (ref 8.5–10.4)
CHLORIDE SERPL-SCNC: 105 MMOL/L (ref 97–107)
CO2 SERPL-SCNC: 22 MMOL/L (ref 24–31)
CREAT SERPL-MCNC: 1.1 MG/DL (ref 0.4–1.6)
ERYTHROCYTE [DISTWIDTH] IN BLOOD BY AUTOMATED COUNT: 15.3 % (ref 11.5–14.5)
GFR SERPL CREATININE-BSD FRML MDRD: 78 ML/MIN/1.73M*2
GLUCOSE SERPL-MCNC: 104 MG/DL (ref 65–99)
HCT VFR BLD AUTO: 43.4 % (ref 41–52)
HGB BLD-MCNC: 13.9 G/DL (ref 13.5–17.5)
MCH RBC QN AUTO: 27.4 PG (ref 26–34)
MCHC RBC AUTO-ENTMCNC: 32 G/DL (ref 32–36)
MCV RBC AUTO: 86 FL (ref 80–100)
NRBC BLD-RTO: 0 /100 WBCS (ref 0–0)
P AXIS: 74 DEGREES
P AXIS: 82 DEGREES
P OFFSET: 163 MS
P OFFSET: 175 MS
P ONSET: 108 MS
P ONSET: 123 MS
PLATELET # BLD AUTO: 282 X10*3/UL (ref 150–450)
POTASSIUM SERPL-SCNC: 3.6 MMOL/L (ref 3.4–5.1)
PR INTERVAL: 192 MS
PR INTERVAL: 216 MS
Q ONSET: 216 MS
Q ONSET: 219 MS
QRS COUNT: 13 BEATS
QRS COUNT: 9 BEATS
QRS DURATION: 100 MS
QRS DURATION: 98 MS
QT INTERVAL: 372 MS
QT INTERVAL: 424 MS
QTC CALCULATION(BAZETT): 402 MS
QTC CALCULATION(BAZETT): 426 MS
QTC FREDERICIA: 407 MS
QTC FREDERICIA: 409 MS
R AXIS: 82 DEGREES
R AXIS: 88 DEGREES
RBC # BLD AUTO: 5.07 X10*6/UL (ref 4.5–5.9)
SODIUM SERPL-SCNC: 139 MMOL/L (ref 133–145)
T AXIS: 59 DEGREES
T AXIS: 60 DEGREES
T OFFSET: 405 MS
T OFFSET: 428 MS
VENTRICULAR RATE: 54 BPM
VENTRICULAR RATE: 79 BPM
WBC # BLD AUTO: 10.7 X10*3/UL (ref 4.4–11.3)

## 2023-12-14 PROCEDURE — 97161 PT EVAL LOW COMPLEX 20 MIN: CPT | Mod: GP

## 2023-12-14 PROCEDURE — 97530 THERAPEUTIC ACTIVITIES: CPT | Mod: GP

## 2023-12-14 PROCEDURE — G0378 HOSPITAL OBSERVATION PER HR: HCPCS

## 2023-12-14 PROCEDURE — 95816 EEG AWAKE AND DROWSY: CPT | Performed by: PSYCHIATRY & NEUROLOGY

## 2023-12-14 PROCEDURE — 85027 COMPLETE CBC AUTOMATED: CPT | Performed by: NURSE PRACTITIONER

## 2023-12-14 PROCEDURE — 84443 ASSAY THYROID STIM HORMONE: CPT | Performed by: NURSE PRACTITIONER

## 2023-12-14 PROCEDURE — 2500000004 HC RX 250 GENERAL PHARMACY W/ HCPCS (ALT 636 FOR OP/ED): Performed by: NURSE PRACTITIONER

## 2023-12-14 PROCEDURE — 95816 EEG AWAKE AND DROWSY: CPT

## 2023-12-14 PROCEDURE — 80048 BASIC METABOLIC PNL TOTAL CA: CPT | Performed by: NURSE PRACTITIONER

## 2023-12-14 PROCEDURE — 36415 COLL VENOUS BLD VENIPUNCTURE: CPT | Performed by: NURSE PRACTITIONER

## 2023-12-14 PROCEDURE — 82607 VITAMIN B-12: CPT | Performed by: NURSE PRACTITIONER

## 2023-12-14 RX ADMIN — LEVETIRACETAM 1000 MG: 10 INJECTION INTRAVENOUS at 17:18

## 2023-12-14 RX ADMIN — LEVETIRACETAM 1000 MG: 10 INJECTION INTRAVENOUS at 04:00

## 2023-12-14 SDOH — ECONOMIC STABILITY: TRANSPORTATION INSECURITY
IN THE PAST 12 MONTHS, HAS THE LACK OF TRANSPORTATION KEPT YOU FROM MEDICAL APPOINTMENTS OR FROM GETTING MEDICATIONS?: NO

## 2023-12-14 SDOH — ECONOMIC STABILITY: HOUSING INSECURITY: IN THE LAST 12 MONTHS, HOW MANY PLACES HAVE YOU LIVED?: 1

## 2023-12-14 SDOH — SOCIAL STABILITY: SOCIAL INSECURITY: WITHIN THE LAST YEAR, HAVE YOU BEEN AFRAID OF YOUR PARTNER OR EX-PARTNER?: NO

## 2023-12-14 SDOH — HEALTH STABILITY: PHYSICAL HEALTH: ON AVERAGE, HOW MANY DAYS PER WEEK DO YOU ENGAGE IN MODERATE TO STRENUOUS EXERCISE (LIKE A BRISK WALK)?: 0 DAYS

## 2023-12-14 SDOH — SOCIAL STABILITY: SOCIAL INSECURITY: ABUSE: ADULT

## 2023-12-14 SDOH — SOCIAL STABILITY: SOCIAL INSECURITY
WITHIN THE LAST YEAR, HAVE YOU BEEN KICKED, HIT, SLAPPED, OR OTHERWISE PHYSICALLY HURT BY YOUR PARTNER OR EX-PARTNER?: NO

## 2023-12-14 SDOH — SOCIAL STABILITY: SOCIAL INSECURITY: DOES ANYONE TRY TO KEEP YOU FROM HAVING/CONTACTING OTHER FRIENDS OR DOING THINGS OUTSIDE YOUR HOME?: NO

## 2023-12-14 SDOH — HEALTH STABILITY: MENTAL HEALTH: HOW MANY STANDARD DRINKS CONTAINING ALCOHOL DO YOU HAVE ON A TYPICAL DAY?: PATIENT DOES NOT DRINK

## 2023-12-14 SDOH — ECONOMIC STABILITY: INCOME INSECURITY: IN THE PAST 12 MONTHS, HAS THE ELECTRIC, GAS, OIL, OR WATER COMPANY THREATENED TO SHUT OFF SERVICE IN YOUR HOME?: NO

## 2023-12-14 SDOH — SOCIAL STABILITY: SOCIAL NETWORK
DO YOU BELONG TO ANY CLUBS OR ORGANIZATIONS SUCH AS CHURCH GROUPS UNIONS, FRATERNAL OR ATHLETIC GROUPS, OR SCHOOL GROUPS?: NO

## 2023-12-14 SDOH — ECONOMIC STABILITY: INCOME INSECURITY: IN THE LAST 12 MONTHS, WAS THERE A TIME WHEN YOU WERE NOT ABLE TO PAY THE MORTGAGE OR RENT ON TIME?: NO

## 2023-12-14 SDOH — HEALTH STABILITY: PHYSICAL HEALTH: ON AVERAGE, HOW MANY MINUTES DO YOU ENGAGE IN EXERCISE AT THIS LEVEL?: 0 MIN

## 2023-12-14 SDOH — HEALTH STABILITY: MENTAL HEALTH: HOW OFTEN DO YOU HAVE 6 OR MORE DRINKS ON ONE OCCASION?: NEVER

## 2023-12-14 SDOH — ECONOMIC STABILITY: HOUSING INSECURITY
IN THE LAST 12 MONTHS, WAS THERE A TIME WHEN YOU DID NOT HAVE A STEADY PLACE TO SLEEP OR SLEPT IN A SHELTER (INCLUDING NOW)?: NO

## 2023-12-14 SDOH — SOCIAL STABILITY: SOCIAL NETWORK: HOW OFTEN DO YOU ATTEND CHURCH OR RELIGIOUS SERVICES?: NEVER

## 2023-12-14 SDOH — ECONOMIC STABILITY: FOOD INSECURITY: WITHIN THE PAST 12 MONTHS, YOU WORRIED THAT YOUR FOOD WOULD RUN OUT BEFORE YOU GOT MONEY TO BUY MORE.: NEVER TRUE

## 2023-12-14 SDOH — SOCIAL STABILITY: SOCIAL INSECURITY: WITHIN THE LAST YEAR, HAVE YOU BEEN HUMILIATED OR EMOTIONALLY ABUSED IN OTHER WAYS BY YOUR PARTNER OR EX-PARTNER?: NO

## 2023-12-14 SDOH — SOCIAL STABILITY: SOCIAL INSECURITY
WITHIN THE LAST YEAR, HAVE TO BEEN RAPED OR FORCED TO HAVE ANY KIND OF SEXUAL ACTIVITY BY YOUR PARTNER OR EX-PARTNER?: NO

## 2023-12-14 SDOH — SOCIAL STABILITY: SOCIAL NETWORK: HOW OFTEN DO YOU GET TOGETHER WITH FRIENDS OR RELATIVES?: NEVER

## 2023-12-14 SDOH — HEALTH STABILITY: MENTAL HEALTH: HOW OFTEN DO YOU HAVE A DRINK CONTAINING ALCOHOL?: NEVER

## 2023-12-14 SDOH — SOCIAL STABILITY: SOCIAL INSECURITY: ARE THERE ANY APPARENT SIGNS OF INJURIES/BEHAVIORS THAT COULD BE RELATED TO ABUSE/NEGLECT?: NO

## 2023-12-14 SDOH — SOCIAL STABILITY: SOCIAL NETWORK: IN A TYPICAL WEEK, HOW MANY TIMES DO YOU TALK ON THE PHONE WITH FAMILY, FRIENDS, OR NEIGHBORS?: NEVER

## 2023-12-14 SDOH — SOCIAL STABILITY: SOCIAL NETWORK: ARE YOU MARRIED, WIDOWED, DIVORCED, SEPARATED, NEVER MARRIED, OR LIVING WITH A PARTNER?: DIVORCED

## 2023-12-14 SDOH — SOCIAL STABILITY: SOCIAL INSECURITY: HAS ANYONE EVER THREATENED TO HURT YOUR FAMILY OR YOUR PETS?: NO

## 2023-12-14 SDOH — SOCIAL STABILITY: SOCIAL INSECURITY: DO YOU FEEL UNSAFE GOING BACK TO THE PLACE WHERE YOU ARE LIVING?: NO

## 2023-12-14 SDOH — SOCIAL STABILITY: SOCIAL INSECURITY: DO YOU FEEL ANYONE HAS EXPLOITED OR TAKEN ADVANTAGE OF YOU FINANCIALLY OR OF YOUR PERSONAL PROPERTY?: NO

## 2023-12-14 SDOH — HEALTH STABILITY: MENTAL HEALTH
STRESS IS WHEN SOMEONE FEELS TENSE, NERVOUS, ANXIOUS, OR CAN'T SLEEP AT NIGHT BECAUSE THEIR MIND IS TROUBLED. HOW STRESSED ARE YOU?: TO SOME EXTENT

## 2023-12-14 SDOH — SOCIAL STABILITY: SOCIAL INSECURITY: HAVE YOU HAD THOUGHTS OF HARMING ANYONE ELSE?: NO

## 2023-12-14 SDOH — SOCIAL STABILITY: SOCIAL INSECURITY: ARE YOU OR HAVE YOU BEEN THREATENED OR ABUSED PHYSICALLY, EMOTIONALLY, OR SEXUALLY BY ANYONE?: NO

## 2023-12-14 SDOH — ECONOMIC STABILITY: TRANSPORTATION INSECURITY
IN THE PAST 12 MONTHS, HAS LACK OF TRANSPORTATION KEPT YOU FROM MEETINGS, WORK, OR FROM GETTING THINGS NEEDED FOR DAILY LIVING?: NO

## 2023-12-14 SDOH — ECONOMIC STABILITY: FOOD INSECURITY: WITHIN THE PAST 12 MONTHS, THE FOOD YOU BOUGHT JUST DIDN'T LAST AND YOU DIDN'T HAVE MONEY TO GET MORE.: NEVER TRUE

## 2023-12-14 SDOH — SOCIAL STABILITY: SOCIAL NETWORK: HOW OFTEN DO YOU ATTENT MEETINGS OF THE CLUB OR ORGANIZATION YOU BELONG TO?: NEVER

## 2023-12-14 SDOH — SOCIAL STABILITY: SOCIAL INSECURITY: WERE YOU ABLE TO COMPLETE ALL THE BEHAVIORAL HEALTH SCREENINGS?: YES

## 2023-12-14 SDOH — ECONOMIC STABILITY: INCOME INSECURITY: HOW HARD IS IT FOR YOU TO PAY FOR THE VERY BASICS LIKE FOOD, HOUSING, MEDICAL CARE, AND HEATING?: NOT HARD AT ALL

## 2023-12-14 ASSESSMENT — COGNITIVE AND FUNCTIONAL STATUS - GENERAL
MOBILITY SCORE: 24
DAILY ACTIVITIY SCORE: 24
MOBILITY SCORE: 21
MOBILITY SCORE: 20
MOVING TO AND FROM BED TO CHAIR: A LITTLE
PATIENT BASELINE BEDBOUND: NO
CLIMB 3 TO 5 STEPS WITH RAILING: A LITTLE
WALKING IN HOSPITAL ROOM: A LITTLE
DAILY ACTIVITIY SCORE: 24
STANDING UP FROM CHAIR USING ARMS: A LITTLE
MOVING TO AND FROM BED TO CHAIR: A LITTLE
WALKING IN HOSPITAL ROOM: A LITTLE
CLIMB 3 TO 5 STEPS WITH RAILING: A LITTLE

## 2023-12-14 ASSESSMENT — LIFESTYLE VARIABLES
HOW MANY STANDARD DRINKS CONTAINING ALCOHOL DO YOU HAVE ON A TYPICAL DAY: PATIENT DOES NOT DRINK
AUDIT-C TOTAL SCORE: 0
HOW OFTEN DO YOU HAVE 6 OR MORE DRINKS ON ONE OCCASION: NEVER
SKIP TO QUESTIONS 9-10: 1
HOW OFTEN DO YOU HAVE A DRINK CONTAINING ALCOHOL: NEVER
AUDIT-C TOTAL SCORE: 0
AUDIT-C TOTAL SCORE: 0
SKIP TO QUESTIONS 9-10: 1

## 2023-12-14 ASSESSMENT — ACTIVITIES OF DAILY LIVING (ADL)
PATIENT'S MEMORY ADEQUATE TO SAFELY COMPLETE DAILY ACTIVITIES?: YES
TOILETING: INDEPENDENT
HEARING - RIGHT EAR: FUNCTIONAL
BATHING: INDEPENDENT
WALKS IN HOME: INDEPENDENT
ADEQUATE_TO_COMPLETE_ADL: YES
ADLS_ADDRESSED: NO
HEARING - LEFT EAR: FUNCTIONAL
GROOMING: INDEPENDENT
LACK_OF_TRANSPORTATION: NO
FEEDING YOURSELF: INDEPENDENT
ADL_ASSISTANCE: INDEPENDENT
JUDGMENT_ADEQUATE_SAFELY_COMPLETE_DAILY_ACTIVITIES: YES
DRESSING YOURSELF: INDEPENDENT

## 2023-12-14 ASSESSMENT — PATIENT HEALTH QUESTIONNAIRE - PHQ9
1. LITTLE INTEREST OR PLEASURE IN DOING THINGS: NOT AT ALL
SUM OF ALL RESPONSES TO PHQ9 QUESTIONS 1 & 2: 0
2. FEELING DOWN, DEPRESSED OR HOPELESS: NOT AT ALL

## 2023-12-14 ASSESSMENT — PAIN SCALES - GENERAL
PAINLEVEL_OUTOF10: 6
PAINLEVEL_OUTOF10: 8
PAINLEVEL_OUTOF10: 0 - NO PAIN

## 2023-12-14 ASSESSMENT — PAIN - FUNCTIONAL ASSESSMENT
PAIN_FUNCTIONAL_ASSESSMENT: 0-10
PAIN_FUNCTIONAL_ASSESSMENT: 0-10

## 2023-12-14 ASSESSMENT — PAIN DESCRIPTION - DESCRIPTORS: DESCRIPTORS: NAGGING;SHARP

## 2023-12-14 NOTE — PROGRESS NOTES
Physical Therapy    Physical Therapy Evaluation & Treatment    Patient Name: Aron Colón  MRN: [id number]  Today's Date: 12/14/2023   Time Calculation  Start Time: 0941  Stop Time: 1003  Time Calculation (min): 22 min    Assessment/Plan   PT Assessment  PT Assessment Results: Impaired balance, Decreased mobility, Decreased safety awareness  Rehab Prognosis: Good  Evaluation/Treatment Tolerance: Patient tolerated treatment well  Medical Staff Made Aware: Yes  Strengths: Housing layout, Living arrangement secure  Barriers to Participation: Ability to acquire knowledge, Financial security  End of Session Communication: Bedside nurse  Assessment Comment: pt slightly unsteady when first standing otherwise requiring close supervision for safety with minimal activity at the bedside. pt will benefit from acute skilled PT services to ensure independence prior to discharge home with 24 hour supervision.  End of Session Patient Position: Bed, 3 rail up (needs in reach, seizure precautions in place.)   IP OR SWING BED PT PLAN  Inpatient or Swing Bed: Inpatient  PT Plan  Treatment/Interventions: Transfer training, Gait training, Therapeutic activity  PT Plan: Skilled PT  PT Frequency: 4 times per week  PT Discharge Recommendations: No PT needed after discharge  PT Recommended Transfer Status: Assist x1, Contact guard  PT - OK to Discharge: Yes    Subjective     General Visit Information:  General  Reason for Referral: Impaired Mobiltiy  Referred By: Celeste Coleman, NEWTON-CNP  Past Medical History Relevant to Rehab: seizure, left shoulder surgery  Family/Caregiver Present: No  Prior to Session Communication: Bedside nurse  Patient Position Received: Bed, 3 rail up, Alarm off, not on at start of session  Preferred Learning Style: verbal (pt with difficulty reading and writing)  General Comment: 58 y.o. male admitted after having a seizure at home, pt was post-ictal upon arrival. Mentation improved and pt experienced another  seizure in the ED. imaging has been negative so far, waiting for MRI to be completed.  Home Living:  Home Living  Type of Home: House  Lives With: Siblings  Home Adaptive Equipment: None  Home Layout: One level  Home Access: Level entry  Bathroom Shower/Tub: Tub/shower unit  Bathroom Toilet: Standard  Bathroom Equipment: None  Home Living Comments: pt lives with 2 brothers, 1 is disabled and pt assists with his care  Prior Level of Function:  Prior Function Per Pt/Caregiver Report  Level of [place]: Independent with ADLs and functional transfers, Independent with homemaking with ambulation  Receives Help From: Family  ADL Assistance: Independent  Homemaking Assistance: Independent  Ambulatory Assistance: Independent  Vocational: Full time employment (maintenance)  Hand Dominance: Right  Prior Function Comments: pt does not drive, one of his brothers drives, denies falls  Precautions:  Precautions  Hearing/Visual Limitations: wears glasses while on the computer  Medical Precautions: Fall precautions, Seizure precautions  Vital Signs:  Vital Signs  Heart Rate: 76  Heart Rate Source: Monitor  SpO2: 93 % (on RA)  BP: 147/89  MAP (mmHg): 104  BP Location: Left arm  BP Method: Automatic  Patient Position: Lying    Objective   Pain:  Pain Assessment  Pain Assessment: 0-10  Pain Score: 8  Pain Type: Acute pain  Pain Location: Shoulder  Pain Orientation: Right  Pain Descriptors: Nagging, Sharp  Cognition:  Cognition  Overall Cognitive Status: Within Functional Limits  Orientation Level: Oriented X4    General Assessments:  General Observation  General Observation: pt with bruising and abrasions on left side of face where his face hit the table during the first seizure at home.     Activity Tolerance  Endurance: Endurance does not limit participation in activity  Activity Tolerance Comments: good    Sensation  Sensation Comment: denies paresthesias    Coordination  Movements are Fluid and Coordinated: Yes  Coordination  Comment: finger-thumb opposition equal bilaterally    Postural Control  Posture Comment: WFL    Static Sitting Balance  Static Sitting-Balance Support: No upper extremity supported, Feet supported  Static Sitting-Level of Assistance: Independent  Dynamic Sitting Balance  Dynamic Sitting-Comments: long sitting in the bed with legs crossed, steady, no difficulty.    Static Standing Balance  Static Standing-Balance Support: No upper extremity supported  Static Standing-Level of Assistance: Contact guard  Static Standing-Comment/Number of Minutes: initial LOB posteriorly with first trial of standing.  Dynamic Standing Balance  Dynamic Standing-Comments: close supervision while taking steps at the bed side, no LOB after initial standing.  Functional Assessments:  ADL  ADL's Addressed: No    Bed Mobility  Bed Mobility:  (Independent with supine<->sit and repositioning in the bed with HOB flat and not using bed rails.)    Transfers  Transfer:  (min A for posterior LOB upon initial stand, otherwise close supervision for sit<->stand transfers)    Ambulation/Gait Training  Ambulation/Gait Training Performed:  (Amb 2 steps forward/backward x 2 without assistive device with close supervision, unable to amb further due to medical equipment and seizure precautions at this time.)    Stairs  Stairs: No  Extremity/Trunk Assessments:  RLE   RLE : Within Functional Limits  LLE   LLE : Within Functional Limits  Treatments:  Therapeutic Activity  Therapeutic Activity Performed:  (limited activitiy at the bed side, staic and brief dynamic standing activities performed, no changes in vital signs. pt educated on activity limitations right now and using the call button appropriately.)  Outcome Measures:  [facility] Basic Mobility  Turning from your back to your side while in a flat bed without using bedrails: None  Moving from lying on your back to sitting on the side of a flat bed without using bedrails: None  Moving to and from bed to chair  (including a wheelchair): A little  Standing up from a chair using your arms (e.g. wheelchair or bedside chair): None  To walk in hospital room: A little  Climbing 3-5 steps with railing: A little  Basic Mobility - Total Score: 21    Encounter Problems       Encounter Problems (Active)       Mobility       STG - Patient will ambulate 200' without assistive device independently (Progressing)       Start:  12/14/23    Expected End:  12/31/23               Transfers       STG - Transfer from bed to chair independently (Progressing)       Start:  12/14/23    Expected End:  12/31/23            STG - Patient will transfer sit to and from stand independently without LOB. (Progressing)       Start:  12/14/23    Expected End:  12/31/23                   Education Documentation  Precautions, taught by Nora Whitmore, PT at 12/14/2023 11:21 AM.  Learner: Patient  Readiness: Acceptance  Method: Explanation  Response: Verbalizes Understanding    Mobility Training, taught by Nora Whitmore, PT at 12/14/2023 11:21 AM.  Learner: Patient  Readiness: Acceptance  Method: Explanation  Response: Verbalizes Understanding    Education Comments  No comments found.

## 2023-12-14 NOTE — ED NOTES
Patient awake at this time. Offered him some food but said he is not hungry. Readjusted him in bed and he is comfortable at this time.      Evangelina Berry RN  12/13/23 2054

## 2023-12-14 NOTE — ED NOTES
History Of Present Illness  Aron Colón is a 58 y.o. male with a past medical history of tobacco use and seizure last year who presented to the emergency department with complaints of a seizure.  Patient states he remembers sitting down having a cup of coffee and waking up in the emergency department.       PMHX:  Past Medical History:   Diagnosis Date    Seizures (CMS/[facility])         No Known Allergies      LABS:   Latest Reference Range & Units 12/14/23 06:59   GLUCOSE 65 - 99 mg/dL 104 (H)   SODIUM 133 - 145 mmol/L 139   POTASSIUM 3.4 - 5.1 mmol/L 3.6   CHLORIDE 97 - 107 mmol/L 105   Bicarbonate 24 - 31 mmol/L 22 (L)   Anion Gap <=19 mmol/L 12   Blood Urea Nitrogen 8 - 25 mg/dL 12   Creatinine 0.40 - 1.60 mg/dL 1.10   EGFR >60 mL/min/1.73m*2 78   Calcium 8.5 - 10.4 mg/dL 8.9   WBC 4.4 - 11.3 x10*3/uL 10.7   nRBC 0.0 - 0.0 /100 WBCs 0.0   RBC 4.50 - 5.90 x10*6/uL 5.07   HEMOGLOBIN 13.5 - 17.5 g/dL 13.9   HEMATOCRIT 41.0 - 52.0 % 43.4   MCV 80 - 100 fL 86   MCH 26.0 - 34.0 pg 27.4   MCHC 32.0 - 36.0 g/dL 32.0   RED CELL DISTRIBUTION WIDTH 11.5 - 14.5 % 15.3 (H)   Platelets 150 - 450 x10*3/uL 282   (H): Data is abnormally high  (L): Data is abnormally low    PLAN: Admit                     Marion Majano RN  12/14/23 9515

## 2023-12-14 NOTE — INDIVIDUALIZED OVERALL PLAN OF CARE NOTE
I tried to see the pt in the ED, but he was no where to be found.  I have ordered an EEG and will complete a full consult tomorrow.

## 2023-12-14 NOTE — PROGRESS NOTES
12/14/23 0817   Physical Activity   On average, how many days per week do you engage in moderate to strenuous exercise (like a brisk walk)? 0 days   On average, how many minutes do you engage in exercise at this level? 0 min   Financial Resource Strain   How hard is it for you to pay for the very basics like food, housing, medical care, and heating? Not hard   Housing Stability   In the last 12 months, was there a time when you were not able to pay the mortgage or rent on time? N   In the last 12 months, how many places have you lived? 1   In the last 12 months, was there a time when you did not have a steady place to sleep or slept in a shelter (including now)? N   Transportation Needs   In the past 12 months, has lack of transportation kept you from medical appointments or from getting medications? no   In the past 12 months, has lack of transportation kept you from meetings, work, or from getting things needed for daily living? No   Food Insecurity   Within the past 12 months, you worried that your food would run out before you got the money to buy more. Never true   Within the past 12 months, the food you bought just didn't last and you didn't have money to get more. Never true   Stress   Do you feel stress - tense, restless, nervous, or anxious, or unable to sleep at night because your mind is troubled all the time - these days? To some exte   Social Connections   In a typical week, how many times do you talk on the phone with family, friends, or neighbors? Never  (Pt lives with 2 brothers)   How often do you get together with friends or relatives? Never  (Pt lives with 2 brothers)   How often do you attend Moravian or Protestant services? Never   Do you belong to any clubs or organizations such as Moravian groups, unions, fraternal or athletic groups, or school groups? No   How often do you attend meetings of the clubs or organizations you belong to? Never   Are you , , , , never  , or living with a partner?    Intimate Partner Violence   Within the last year, have you been afraid of your partner or ex-partner? No   Within the last year, have you been humiliated or emotionally abused in other ways by your partner or ex-partner? No   Within the last year, have you been kicked, hit, slapped, or otherwise physically hurt by your partner or ex-partner? No   Within the last year, have you been raped or forced to have any kind of sexual activity by your partner or ex-partner? No   Alcohol Use   Q1: How often do you have a drink containing alcohol? Never   Q2: How many drinks containing alcohol do you have on a typical day when you are drinking? None   Q3: How often do you have six or more drinks on one occasion? Never   Utilities   In the past 12 months has the electric, gas, oil, or water company threatened to shut off services in your home? No

## 2023-12-14 NOTE — PROGRESS NOTES
12/14/23 0820   [facility] Disability Status   Are you deaf or do you have serious difficulty hearing? N   Are you blind or do you have serious difficulty seeing, even when wearing glasses? N   Because of a physical, mental, or emotional condition, do you have serious difficulty concentrating, remembering, or making decisions? (5 years old or older) N   Do you have serious difficulty walking or climbing stairs? N   Do you have serious difficulty dressing or bathing? N   Because of a physical, mental, or emotional condition, do you have serious difficulty doing errands alone such as visiting the doctor? N  (Pt does not drive; his brother drives; pt has difficulty with reading and writing)

## 2023-12-14 NOTE — ED NOTES
Patient transferred into hospital bed for comfort. Seizure pads applied to rails for safety. Patient resting at this time.      Evangelina Berry RN  12/13/23 4629

## 2023-12-14 NOTE — PROGRESS NOTES
12/14/23 0818   Discharge Planning   Living Arrangements Family members   Support Systems Family members   Assistance Needed Pt does not have insurance.   Type of Residence Private residence   Number of Stairs to Enter Residence 0   Number of Stairs Within Residence 0   Do you have animals or pets at home? No   Who is requesting discharge planning? Provider   Home or Post Acute Services None   Patient expects to be discharged to: Home with family   Does the patient need discharge transport arranged? No   Financial Resource Strain   How hard is it for you to pay for the very basics like food, housing, medical care, and heating? Not hard   Housing Stability   In the last 12 months, was there a time when you were not able to pay the mortgage or rent on time? N   In the last 12 months, how many places have you lived? 1   In the last 12 months, was there a time when you did not have a steady place to sleep or slept in a shelter (including now)? N   Transportation Needs   In the past 12 months, has lack of transportation kept you from medical appointments or from getting medications? no   In the past 12 months, has lack of transportation kept you from meetings, work, or from getting things needed for daily living? No   Patient Choice   Patient / Family choosing to utilize agency / facility established prior to hospitalization No

## 2023-12-14 NOTE — PROGRESS NOTES
12/14/23 0821   Current Planned Discharge Disposition   Current Planned Discharge Disposition Home

## 2023-12-14 NOTE — PROGRESS NOTES
Aron Colón is a 58 y.o. male on day 0 of admission presenting with Seizure (CMS/HCC).      Subjective   Patient seen and examined. Awake/alert/oriented. Feels overall improved from yesterday. Denies chest pain, shortness of breath, nausea, or vomiting. No abdominal discomfort. Denies lightheadedness or dizziness. No vision changes. Does not believe he has had a seizure since yesterday.          Objective     Last Recorded Vitals  /78   Pulse 79   Temp 36.3 °C (97.3 °F) (Oral)   Resp 15   Wt 69.9 kg (154 lb 1.6 oz)   SpO2 97%   Intake/Output last 3 Shifts:    Intake/Output Summary (Last 24 hours) at 12/14/2023 1234  Last data filed at 12/14/2023 0435  Gross per 24 hour   Intake 100 ml   Output --   Net 100 ml       Admission Weight  Weight: 69.9 kg (154 lb 1.6 oz) (12/13/23 0536)    Daily Weight  12/13/23 : 69.9 kg (154 lb 1.6 oz)    Image Results  XR chest 1 view  Narrative: Interpreted By:  Maxim Harmon,   STUDY:  XR CHEST 1 VIEW;  12/13/2023 10:11 am      INDICATION:  Signs/Symptoms:seizure possible aspiration.      COMPARISON:  09/22/2023.      ACCESSION NUMBER(S):  [id number]      ORDERING CLINICIAN:  ABDOULAYE BATEMAN      FINDINGS:                  CARDIOMEDIASTINAL SILHOUETTE:  Cardiomediastinal silhouette is normal in size and configuration.      LUNGS:  Lungs are clear.      ABDOMEN:  No remarkable upper abdominal findings.      BONES:  No acute osseous changes.      Impression: No evidence of acute cardiopulmonary process.          MACRO:  None      Signed by: Maxim Harmon 12/13/2023 11:17 AM  Dictation workstation:   [id number]  ECG 12 lead  Sinus bradycardia with 1st degree AV block  Otherwise normal ECG  When compared with ECG of 13-DEC-2023 05:38, (unconfirmed)  No significant change was found  ECG 12 lead  Normal sinus rhythm  Normal ECG  No previous ECGs available  CT maxillofacial bones wo IV contrast  Narrative: Interpreted By:  Hernandez Hodges,   STUDY:  CT FACIAL BONES WO IV CONTRAST   12/13/2023 6:47 am      INDICATION:  Signs/Symptoms: Seizure with facial trauma      COMPARISON:  None.      ACCESSION NUMBER(S):  [id number]      ORDERING CLINICIAN:  ALISON VICENTE      TECHNIQUE:  Thin cut axial CT images through the facial bones were obtained and  reconstructed in the coronal  and sagittal plane.      FINDINGS:  Orbits: The bony orbits are intact. The orbital contents are  unremarkable.      Facial Bones: There is no displaced facial bone fracture.      Mandible/Temporomandibular Joints: Visualized portions of mandible  and bilateral temporomandibular joints are intact.      Paranasal Sinuses/Mastoids: Visualized paranasal sinuses and mastoids  are clear. 10.5 mm osteoma in the left ethmoid sinuses.      Soft tissues: Unremarkable.          Impression: No acute facial bone fracture visualized.      MACRO:  None      Signed by: Hernandez Hodges 12/13/2023 8:23 AM  Dictation workstation:   [id number]  CT cervical spine wo IV contrast  Narrative: Interpreted By:  Declan Tellez,   STUDY:  CT CERVICAL SPINE WO IV CONTRAST; 12/13/2023 6:47 am      INDICATION:  Signs/Symptoms:neck trauma, AMS;      COMPARISON:  None      ACCESSION NUMBER(S):  [id number]      ORDERING CLINICIAN:  ALISON VICENTE      TECHNIQUE:  Contiguous axial images of the cervical spine were performed.  PATIENT RADIATION EXPOSURE DATA:  CTDI:  DLP:      All CT examinations are performed with one or more of the following  dose reduction techniques: Automated Exposure Control, adjustment of  mA and/or kV according to patient size, or use of iterative  reconstruction techniques.      FINDINGS:  There is straightening and reversal of the normal cervical lordosis.      No acute fracture or spondylolisthesis is identified.      Multilevel disc space narrowing can be seen. Endplate osteophytosis  facet arthropathy is noted.      Severe multilevel neural foraminal narrowing is present.      Calcific atherosclerosis of  the carotid bulbs can be seen bilaterally.      Limited visualization of the lung apices are unremarkable.      Impression: 1. No acute fracture or spondylolisthesis.  2. Degenerative disc disease and spondylosis.      .  .      This report is in agreement with the preliminary report issued by  [employer].      MACRO:  None.      Signed by: Declan Tellez 12/13/2023 7:58 AM  Dictation workstation:   [id number]  CT head wo IV contrast  Narrative: Interpreted By:  Declan Tellez,   STUDY:  CT HEAD WO IV CONTRAST; 12/13/2023 6:47 am      INDICATION:  Signs/Symptoms:head injury after seizure;      COMPARISON:  09/22/2023      ACCESSION NUMBER(S):  [id number]      ORDERING CLINICIAN:  ALISON VICENTE      TECHNIQUE:  Contiguous axial images were acquired from the vertex through the  posterior fossa without IV contrast.      All CT examinations are performed with one or more of the following  dose reduction techniques: Automated Exposure Control, adjustment of  mA and/or kV according to patient size, or use of iterative  reconstruction techniques.      FINDINGS:  No focal mass effect or midline shift is identified. The ventricles  and sulci are symmetric and appropriate for the patient's age.  However, chronic involutional change of the cerebral hemispheres is  noted.      The gray white matter differentiation is preserved. Nonspecific  hypodensities are identified within the periventricular and  subcortical white matter likely due to chronic postischemic white  matter disease. Atherosclerotic calcifications are seen within the  carotid siphons and vertebral arteries.      No acute intracranial hemorrhage is identified. No intra-axial or  extra-axial fluid collection is seen.      Osteoma is noted within the left ethmoidal air cells. The mastoid air  cells and middle ears are clear. No calvarial fracture is identified.      Impression: No acute intracranial findings.      MACRO:  None      Signed by:  Declan Tellez 12/13/2023 7:44 AM  Dictation workstation:   [id number]      Physical Exam  Vitals reviewed.   Constitutional:       Appearance: Normal appearance.   HENT:      Head: Normocephalic.      Comments: Erythema, swelling, and ecchymosis to face     Nose: Nose normal.      Mouth/Throat:      Mouth: Mucous membranes are moist.   Eyes:      Extraocular Movements: Extraocular movements intact.      Conjunctiva/sclera: Conjunctivae normal.   Cardiovascular:      Rate and Rhythm: Normal rate and regular rhythm.   Pulmonary:      Effort: Pulmonary effort is normal.      Breath sounds: Wheezing present. No rhonchi or rales.   Abdominal:      General: Bowel sounds are normal.      Palpations: Abdomen is soft.      Tenderness: There is no abdominal tenderness.   Musculoskeletal:         General: Normal range of motion.   Skin:     General: Skin is warm and dry.   Neurological:      General: No focal deficit present.      Mental Status: He is alert and oriented to person, place, and time.         Relevant Results  Lab Results   Component Value Date    GLUCOSE 104 (H) 12/14/2023    CALCIUM 8.9 12/14/2023     12/14/2023    K 3.6 12/14/2023    CO2 22 (L) 12/14/2023     12/14/2023    BUN 12 12/14/2023    CREATININE 1.10 12/14/2023     Lab Results   Component Value Date    WBC 10.7 12/14/2023    HGB 13.9 12/14/2023    HCT 43.4 12/14/2023    MCV 86 12/14/2023     12/14/2023            Assessment/Plan      Principal Problem:    Seizure (CMS/HCC)    Seizures  Patient has had 3 seizures in the last 24 hours  Does have a history of a seizure about a year ago that was never worked up  CT of the head, C-spine, and face unremarkable  Tox screen positive for THC  Ammonia level 69  Seizure precautions  IV Keppra  As needed Ativan  MRI of the brain  EEG  Consult neurology, appreciate recommendations     Fall  Secondary to seizure  Fall precautions  PT/OT     Tobacco use  Notable wheezing on exam  DuoNebs and  Pulmicort  Nicotine patch  Smoking cessation     Plan  Admit to telemetry  IV Keppra  As needed Ativan  Seizure/fall precautions  MRI/EEG  Neurology consulted, appreciate recommendations  DVT risk score low, ambulate with assistance              NEWTON Corona-CNP

## 2023-12-14 NOTE — CARE PLAN
Pt does not have a POA or Living Will    ADOD: 2 days    Pt lives at home with his 2 brothers, one is disabled and he helps care for him. He lives on the first floor of an apartment; he is the maintainance person  He is independent with ADL's, he does not drive, one of his brothers does the driving  and they all share with the household chores.  He wears glasses while on the computer; he said that he has some issues with reading and writing. He does not have health insurance.  This is his second seizure in the last couple of weeks. He does not remember what hospital he was in for his first seizure, he never followed up due to lack of insurance.  Care Coordination will give pt phone number for [place] Medicaid and [facility], alone with address and phone number for the [facility], and the number for [facility] medication repository.    08:43 pt given list of food pantries, numbers for [facility], [facility], [facility] ([facility] and [facility]- they have PCP's and a Pharmacy there- sliding scale), [facility], [facility] Medication assistance program (repository), and Medicaid application phone number.    DISCHARGE PLAN: HOME WITH BROTHERS.                                  Pt sts it is hard to buy food sometimes; Care Coordination will provide pt with list of local food pantries.

## 2023-12-14 NOTE — NURSING NOTE
Pt to unit. 6/10 rt shoulder pain   Spoke to Lola at [employer]: [phone]  Currently has placed multiple calls and prior auths that have been approved but benefits is still unwilling to pay  Per Lola she has asked mom to obtain the employment human resources department for support  I have spoken to mom as well and she will be giving that information needed over [employer]  I expressed my apologies for the inconvenience of the case but have offered to help supply mom with formula if in need

## 2023-12-14 NOTE — NURSING NOTE
Pt is AxOx3, and denies needing pain medication at this time. Abrasions and bruises present to lt face, skin otherwise intact. Seizure precautions in place. Bed alarm on and call light in reach. Tele on per order.

## 2023-12-15 ENCOUNTER — APPOINTMENT (OUTPATIENT)
Dept: RADIOLOGY | Facility: HOSPITAL | Age: 58
DRG: 101 | End: 2023-12-15

## 2023-12-15 VITALS
OXYGEN SATURATION: 98 % | WEIGHT: 154.1 LBS | SYSTOLIC BLOOD PRESSURE: 128 MMHG | TEMPERATURE: 97.9 F | DIASTOLIC BLOOD PRESSURE: 58 MMHG | BODY MASS INDEX: 22.06 KG/M2 | RESPIRATION RATE: 18 BRPM | HEIGHT: 70 IN | HEART RATE: 70 BPM

## 2023-12-15 LAB
25(OH)D3 SERPL-MCNC: 14 NG/ML (ref 31–100)
NT-PROBNP SERPL-MCNC: 108 PG/ML (ref 0–177)
TSH SERPL DL<=0.05 MIU/L-ACNC: 0.39 MIU/L (ref 0.27–4.2)
VIT B12 SERPL-MCNC: 515 PG/ML (ref 211–946)

## 2023-12-15 PROCEDURE — 97165 OT EVAL LOW COMPLEX 30 MIN: CPT | Mod: GO

## 2023-12-15 PROCEDURE — 82306 VITAMIN D 25 HYDROXY: CPT | Performed by: NURSE PRACTITIONER

## 2023-12-15 PROCEDURE — 2500000001 HC RX 250 WO HCPCS SELF ADMINISTERED DRUGS (ALT 637 FOR MEDICARE OP): Performed by: NURSE PRACTITIONER

## 2023-12-15 PROCEDURE — 97530 THERAPEUTIC ACTIVITIES: CPT | Mod: GO

## 2023-12-15 PROCEDURE — 2500000002 HC RX 250 W HCPCS SELF ADMINISTERED DRUGS (ALT 637 FOR MEDICARE OP, ALT 636 FOR OP/ED): Performed by: NURSE PRACTITIONER

## 2023-12-15 PROCEDURE — 36415 COLL VENOUS BLD VENIPUNCTURE: CPT | Performed by: NURSE PRACTITIONER

## 2023-12-15 PROCEDURE — 2500000004 HC RX 250 GENERAL PHARMACY W/ HCPCS (ALT 636 FOR OP/ED): Performed by: NURSE PRACTITIONER

## 2023-12-15 PROCEDURE — 70551 MRI BRAIN STEM W/O DYE: CPT

## 2023-12-15 PROCEDURE — S4991 NICOTINE PATCH NONLEGEND: HCPCS | Performed by: NURSE PRACTITIONER

## 2023-12-15 PROCEDURE — 1200000002 HC GENERAL ROOM WITH TELEMETRY DAILY

## 2023-12-15 PROCEDURE — 97116 GAIT TRAINING THERAPY: CPT | Mod: GP

## 2023-12-15 RX ORDER — LEVETIRACETAM 500 MG/1
500 TABLET ORAL 2 TIMES DAILY
Status: DISCONTINUED | OUTPATIENT
Start: 2023-12-15 | End: 2023-12-15 | Stop reason: HOSPADM

## 2023-12-15 RX ORDER — LEVETIRACETAM 500 MG/1
500 TABLET ORAL 2 TIMES DAILY
Qty: 60 TABLET | Refills: 2 | Status: SHIPPED | OUTPATIENT
Start: 2023-12-15

## 2023-12-15 RX ORDER — IBUPROFEN 200 MG
1 TABLET ORAL EVERY 24 HOURS
Qty: 28 PATCH | Refills: 0 | Status: SHIPPED | OUTPATIENT
Start: 2023-12-15

## 2023-12-15 RX ORDER — ALBUTEROL SULFATE 90 UG/1
2 AEROSOL, METERED RESPIRATORY (INHALATION) EVERY 4 HOURS PRN
Qty: 8.5 G | Refills: 1 | Status: SHIPPED | OUTPATIENT
Start: 2023-12-15

## 2023-12-15 RX ADMIN — LEVETIRACETAM 1000 MG: 10 INJECTION INTRAVENOUS at 03:41

## 2023-12-15 RX ADMIN — LEVETIRACETAM 500 MG: 500 TABLET, FILM COATED ORAL at 12:34

## 2023-12-15 RX ADMIN — Medication 1 PATCH: at 08:16

## 2023-12-15 ASSESSMENT — COGNITIVE AND FUNCTIONAL STATUS - GENERAL
CLIMB 3 TO 5 STEPS WITH RAILING: A LITTLE
HELP NEEDED FOR BATHING: A LITTLE
TOILETING: A LITTLE
DAILY ACTIVITIY SCORE: 24
PERSONAL GROOMING: A LITTLE
MOBILITY SCORE: 24
MOBILITY SCORE: 22
DRESSING REGULAR UPPER BODY CLOTHING: A LITTLE
WALKING IN HOSPITAL ROOM: A LITTLE
EATING MEALS: A LITTLE
DRESSING REGULAR LOWER BODY CLOTHING: A LITTLE
DAILY ACTIVITIY SCORE: 18

## 2023-12-15 ASSESSMENT — ACTIVITIES OF DAILY LIVING (ADL)
ADL_ASSISTANCE: INDEPENDENT
BATHING_ASSISTANCE: STAND BY

## 2023-12-15 ASSESSMENT — PAIN - FUNCTIONAL ASSESSMENT
PAIN_FUNCTIONAL_ASSESSMENT: 0-10
PAIN_FUNCTIONAL_ASSESSMENT: 0-10

## 2023-12-15 ASSESSMENT — ENCOUNTER SYMPTOMS: SEIZURES: 1

## 2023-12-15 ASSESSMENT — PAIN SCALES - GENERAL
PAINLEVEL_OUTOF10: 0 - NO PAIN
PAINLEVEL_OUTOF10: 6

## 2023-12-15 NOTE — CONSULTS
Inpatient consult to Neurology  Consult performed by: NEWTON Leary-CNP  Consult ordered by: NEWTON Corona-CNP          History Of Present Illness  Aorn Colón is a 58 y.o. male presenting with seizure.  Patient is alert and oriented to all today and tells me that he did have a seizure 5 years ago he assumed it was a fluke and never proceeded with follow-up neurology care after that.  Apparently 2 days ago he was having coffee sitting at home when his brother witnessed him fall over he did suffer injury to the face and has a black left eye because of this he was shaking no loss of bowel or bladder he does not know how long this persisted but he was postictal after.  It is also documented in the chart that he had 2 witnessed seizures in the emergency department on arrival.  He denies weakness, numbness, tingling, headache, vision changes.  Feels he is back to baseline today.  Denies alcohol use does smoke 1 pack/day of tobacco and also occasional marijuana use.  Past Medical History  Past Medical History:   Diagnosis Date    Seizures (CMS/[facility])      Surgical History  Past Surgical History:   Procedure Laterality Date    SHOULDER SURGERY Left      Social History  Social History     Tobacco Use    Smoking status: Every Day     Types: Cigarettes    Smokeless tobacco: Never   Substance Use Topics    Alcohol use: Never    Drug use: Yes     Types: Marijuana     Allergies  Patient has no known allergies.  No medications prior to admission.       Review of Systems   Neurological:  Positive for seizures.     Neurological Exam  Neurologic exam:    Awake alert oriented to all, no dysarthria, no aphasia  Ecchymosis to left orbit  Naming repetition intact, speech is fluent  PERRL, EOMI without ptosis or nystagmus, visual fields intact, face symmetrical, tongue midline  Strength in upper and lower extremities is 5/5  Sensation is intact  FTN intact WARD intact  Heel-to-shin without ataxia  Reflexes  2  "throughout,  Toes downgoing bilaterally  Gait not assessed at this time    Physical Exam  Cardiovascular:      Rate and Rhythm: Normal rate.   Pulmonary:      Effort: Pulmonary effort is normal.   Psychiatric:         Mood and Affect: Mood normal.       Last Recorded Vitals  Blood pressure 114/72, pulse 67, temperature 36.7 °C (98.1 °F), temperature source Oral, resp. rate 18, height 1.778 m (5' 10\"), weight 69.9 kg (154 lb 1.6 oz), SpO2 98 %.    Relevant Results        NIH Stroke Scale  1A. Level of Consciousness: Alert, Keenly Responsive  1B. Ask Month and Age: Both Questions Right  1C. Blink Eyes & Squeeze Hands: Performs Both Tasks  2. Best Gaze: Normal  3. Visual: No Visual Loss  4. Facial Palsy: Normal Symmetrical Movements  5A. Motor - Left Arm: No Drift  5B. Motor - Right Arm: No Drift  6A. Motor - Left Leg: No Drift  6B. Motor - Right Leg: No Drift  7. Limb Ataxia: Absent  8. Sensory Loss: Normal  9. Best Language: No Aphasia  10. Dysarthria: Mild-to-Moderate Dysarthria  11. Extinction and Inattention: No Abnormality  NIH Stroke Scale: 1           [place] Coma Scale  Best Eye Response: Spontaneous  Best Verbal Response: Oriented  Best Motor Response: Follows commands  [place] Coma Scale Score: 15                 I have personally reviewed the following imaging results EEG    Result Date: 12/14/2023  IMPRESSION Impression This is a normal awake and drowsy EEG. No epileptiform activity or lateralizing signs are seen. One right thumb clonic events was captured with no EEG correlate. A full report will be scanned into the patient's chart at a later time. This report has been interpreted and electronically signed by    ECG 12 lead    Result Date: 12/14/2023  Sinus bradycardia with 1st degree AV block Otherwise normal ECG When compared with ECG of 13-DEC-2023 05:38, (unconfirmed) No significant change was found Confirmed by Trevon Beaver ([id number]) on 12/14/2023 12:52:43 PM    ECG 12 lead    Result Date: " 12/14/2023  Normal sinus rhythm Normal ECG No previous ECGs available Confirmed by Trevon Beaver ([id number]) on 12/14/2023 12:51:57 PM    XR chest 1 view    Result Date: 12/13/2023  Interpreted By:  Maxim Harmon, STUDY: XR CHEST 1 VIEW;  12/13/2023 10:11 am   INDICATION: Signs/Symptoms:seizure possible aspiration.   COMPARISON: 09/22/2023.   ACCESSION NUMBER(S): [id number]   ORDERING CLINICIAN: ABDOULAYE BATEMAN   FINDINGS:         CARDIOMEDIASTINAL SILHOUETTE: Cardiomediastinal silhouette is normal in size and configuration.   LUNGS: Lungs are clear.   ABDOMEN: No remarkable upper abdominal findings.   BONES: No acute osseous changes.       No evidence of acute cardiopulmonary process.     MACRO: None   Signed by: Maxim Harmon 12/13/2023 11:17 AM Dictation workstation:   [id number]    CT maxillofacial bones wo IV contrast    Result Date: 12/13/2023  Interpreted By:  Hernandez Hodges, STUDY: CT FACIAL BONES WO IV CONTRAST  12/13/2023 6:47 am   INDICATION: Signs/Symptoms: Seizure with facial trauma   COMPARISON: None.   ACCESSION NUMBER(S): [id number]   ORDERING CLINICIAN: ALISON VICENTE   TECHNIQUE: Thin cut axial CT images through the facial bones were obtained and reconstructed in the coronal  and sagittal plane.   FINDINGS: Orbits: The bony orbits are intact. The orbital contents are unremarkable.   Facial Bones: There is no displaced facial bone fracture.   Mandible/Temporomandibular Joints: Visualized portions of mandible and bilateral temporomandibular joints are intact.   Paranasal Sinuses/Mastoids: Visualized paranasal sinuses and mastoids are clear. 10.5 mm osteoma in the left ethmoid sinuses.   Soft tissues: Unremarkable.         No acute facial bone fracture visualized.   MACRO: None   Signed by: Hernandez Hodges 12/13/2023 8:23 AM Dictation workstation:   [id number]    CT cervical spine wo IV contrast    Result Date: 12/13/2023  Interpreted By:  Declan Tellez, STUDY: CT CERVICAL SPINE WO IV CONTRAST;  12/13/2023 6:47 am   INDICATION: Signs/Symptoms:neck trauma, AMS;   COMPARISON: None   ACCESSION NUMBER(S): [id number]   ORDERING CLINICIAN: ALISON VICENTE   TECHNIQUE: Contiguous axial images of the cervical spine were performed. PATIENT RADIATION EXPOSURE DATA: CTDI: DLP:   All CT examinations are performed with one or more of the following dose reduction techniques: Automated Exposure Control, adjustment of mA and/or kV according to patient size, or use of iterative reconstruction techniques.   FINDINGS: There is straightening and reversal of the normal cervical lordosis.   No acute fracture or spondylolisthesis is identified.   Multilevel disc space narrowing can be seen. Endplate osteophytosis facet arthropathy is noted.   Severe multilevel neural foraminal narrowing is present.   Calcific atherosclerosis of the carotid bulbs can be seen bilaterally.   Limited visualization of the lung apices are unremarkable.       1. No acute fracture or spondylolisthesis. 2. Degenerative disc disease and spondylosis.   . .   This report is in agreement with the preliminary report issued by [employer].   MACRO: None.   Signed by: Declan Tellez 12/13/2023 7:58 AM Dictation workstation:   [id number]    CT head wo IV contrast    Result Date: 12/13/2023  Interpreted By:  Declan Tellez, STUDY: CT HEAD WO IV CONTRAST; 12/13/2023 6:47 am   INDICATION: Signs/Symptoms:head injury after seizure;   COMPARISON: 09/22/2023   ACCESSION NUMBER(S): [id number]   ORDERING CLINICIAN: ALISON VIECNTE   TECHNIQUE: Contiguous axial images were acquired from the vertex through the posterior fossa without IV contrast.   All CT examinations are performed with one or more of the following dose reduction techniques: Automated Exposure Control, adjustment of mA and/or kV according to patient size, or use of iterative reconstruction techniques.   FINDINGS: No focal mass effect or midline shift is identified. The ventricles  and sulci are symmetric and appropriate for the patient's age. However, chronic involutional change of the cerebral hemispheres is noted.   The gray white matter differentiation is preserved. Nonspecific hypodensities are identified within the periventricular and subcortical white matter likely due to chronic postischemic white matter disease. Atherosclerotic calcifications are seen within the carotid siphons and vertebral arteries.   No acute intracranial hemorrhage is identified. No intra-axial or extra-axial fluid collection is seen.   Osteoma is noted within the left ethmoidal air cells. The mastoid air cells and middle ears are clear. No calvarial fracture is identified.       No acute intracranial findings.   MACRO: None   Signed by: Declan Tellez 12/13/2023 7:44 AM Dictation workstation:   [id number]       Assessment/Plan   Principal Problem:    Seizure (CMS/HCC)             58-year-old male with history of 1 seizure 5 years ago without difficulty since presented the emergency department 2 days ago with concerns for seizure witnessed by his brother.  Then shortly after arrival to more witnessed seizures in the emergency department.  Patient is alert and oriented today back to baseline.  EEG is noted as normal however given that he has had multiple seizures he will remain on antiseizure medication of levetiracetam 500 mg twice daily.  MRI pending.  Will reassess tomorrow.      Racheal Maravilla, NEWTON-CNP

## 2023-12-15 NOTE — PROGRESS NOTES
Physical Therapy    Physical Therapy Treatment    Patient Name: Aron Colón  MRN: [id number]  Today's Date: 12/15/2023  Time Calculation  Start Time: 1302  Stop Time: 1310  Time Calculation (min): 8 min       Assessment/Plan   PT Assessment  Medical Staff Made Aware: Yes  Strengths: Ability to acquire knowledge  End of Session Communication: Bedside nurse  Assessment Comment: pt has met all goals; progressing well and denies needs/concerns for homegoing. pt is at baseline functional mobility.  End of Session Patient Position: Up in chair  PT Plan  Inpatient/Swing Bed or Outpatient: Inpatient  PT Plan  Treatment/Interventions: Transfer training, Gait training, Therapeutic activity  PT Plan: Skilled PT  PT Frequency: 4 times per week  PT Discharge Recommendations: No further acute PT  PT Recommended Transfer Status: Stand by assist  PT - OK to Discharge: Yes      General Visit Information:   PT  Visit  PT Received On: 12/15/23  General  Prior to Session Communication: Bedside nurse  General Comment: pt up in chair upon arrival, agreeable to therapy and cleared by nursing. reports he feels back to baseline.    Subjective   Precautions:  Precautions  Medical Precautions: Fall precautions, Seizure precautions    Objective   Pain:  Pain Assessment  Pain Assessment: 0-10  Cognition:  Cognition  Overall Cognitive Status: Within Functional Limits  Postural Control:  Postural Control  Postural Control: Within Functional Limits    Activity Tolerance:  Activity Tolerance  Endurance: Endurance does not limit participation in activity  Treatments:  Ambulation/Gait Training  Ambulation/Gait Training Performed: Yes  Ambulation/Gait Training 1  Comments/Distance (ft) 1: pt ambulated 200 ft x 1 with supervision initially for safety progressing to ind. demonstrates age appropriate clarice, reciprocal gait pattern. no LOB, denies s/s.  reports he is at baseline.  Transfers  Transfer: Yes  Transfer 1  Trials/Comments 1: supervision  progressing to ind for sit <> stand from chair, no AD. no LOB and denies s/s.    Outcome Measures:  [facility] Basic Mobility  Turning from your back to your side while in a flat bed without using bedrails: None  Moving from lying on your back to sitting on the side of a flat bed without using bedrails: None  Moving to and from bed to chair (including a wheelchair): None  Standing up from a chair using your arms (e.g. wheelchair or bedside chair): None  To walk in hospital room: None  Climbing 3-5 steps with railing: None  Basic Mobility - Total Score: 24    Encounter Problems       Encounter Problems (Active)       Pain - Adult             Encounter Problems (Resolved)       Mobility       STG - Patient will ambulate 200' without assistive device independently (Met)       Start:  12/14/23    Expected End:  12/31/23    Resolved:  12/15/23            Transfers       STG - Transfer from bed to chair independently (Met)       Start:  12/14/23    Expected End:  12/31/23    Resolved:  12/15/23         STG - Patient will transfer sit to and from stand independently without LOB. (Met)       Start:  12/14/23    Expected End:  12/31/23    Resolved:  12/15/23

## 2023-12-15 NOTE — PROGRESS NOTES
Aron Colón is a 58 y.o. male on day 0 of admission presenting with Seizure (CMS/HCC).      Subjective   Patient seen and examined. Awake/alert/oriented. Denies chest pain, shortness of breath, nausea, or vomiting. No further seizure activity.          Objective     Last Recorded Vitals  /72 (BP Location: Left arm, Patient Position: Sitting)   Pulse 67   Temp 36.7 °C (98.1 °F) (Oral)   Resp 18   Wt 69.9 kg (154 lb 1.6 oz)   SpO2 98%   Intake/Output last 3 Shifts:    Intake/Output Summary (Last 24 hours) at 12/15/2023 1334  Last data filed at 12/15/2023 0800  Gross per 24 hour   Intake 200 ml   Output 300 ml   Net -100 ml         Admission Weight  Weight: 69.9 kg (154 lb 1.6 oz) (12/13/23 0536)    Daily Weight  12/13/23 : 69.9 kg (154 lb 1.6 oz)    Image Results  EEG  IMPRESSION    Impression    This is a normal awake and drowsy EEG. No epileptiform activity or lateralizing signs are seen. One right thumb clonic events was captured with no EEG correlate.    A full report will be scanned into the patient's chart at a later time.    This report has been interpreted and electronically signed by  ECG 12 lead  Sinus bradycardia with 1st degree AV block  Otherwise normal ECG  When compared with ECG of 13-DEC-2023 05:38, (unconfirmed)  No significant change was found  Confirmed by Trevon Beaver ([id number]) on 12/14/2023 12:52:43 PM  ECG 12 lead  Normal sinus rhythm  Normal ECG  No previous ECGs available  Confirmed by Trevon Beaver ([id number]) on 12/14/2023 12:51:57 PM      Physical Exam  Vitals reviewed.   Constitutional:       Appearance: Normal appearance.   HENT:      Head: Normocephalic.      Comments: Erythema, swelling, and ecchymosis to face     Nose: Nose normal.      Mouth/Throat:      Mouth: Mucous membranes are moist.   Eyes:      Extraocular Movements: Extraocular movements intact.      Conjunctiva/sclera: Conjunctivae normal.   Cardiovascular:      Rate and Rhythm: Normal rate and regular rhythm.    Pulmonary:      Effort: Pulmonary effort is normal.      Breath sounds: Wheezing present. No rhonchi or rales.   Abdominal:      General: Bowel sounds are normal.      Palpations: Abdomen is soft.      Tenderness: There is no abdominal tenderness.   Musculoskeletal:         General: Normal range of motion.   Skin:     General: Skin is warm and dry.   Neurological:      General: No focal deficit present.      Mental Status: He is alert and oriented to person, place, and time.         Relevant Results  Lab Results   Component Value Date    GLUCOSE 104 (H) 12/14/2023    CALCIUM 8.9 12/14/2023     12/14/2023    K 3.6 12/14/2023    CO2 22 (L) 12/14/2023     12/14/2023    BUN 12 12/14/2023    CREATININE 1.10 12/14/2023     Lab Results   Component Value Date    WBC 10.7 12/14/2023    HGB 13.9 12/14/2023    HCT 43.4 12/14/2023    MCV 86 12/14/2023     12/14/2023            Assessment/Plan      Principal Problem:    Seizure (CMS/HCC)    Seizures  Patient has had 3 seizures in the last 24 hours  Does have a history of a seizure about a year ago that was never worked up  CT of the head, C-spine, and face unremarkable  Tox screen positive for THC  Ammonia level 69  Seizure precautions  IV Keppra, transitioned to oral per neurology recommendations  As needed Ativan  MRI of the brain pending  EEG complete, results above  Consult neurology, appreciate recommendations     Fall  Secondary to seizure  Fall precautions  PT/OT     Tobacco use  Notable wheezing on exam-resolved  DuoNebs and Pulmicort  Nicotine patch  Smoking cessation     Plan  Admit to telemetry  IV Keppra  As needed Ativan  Seizure/fall precautions  MRI/EEG  Neurology consulted, appreciate recommendations  DVT risk score low, ambulate with assistance  Likely discharge tomorrow pending MRI results and neuro to clear  Patient would like to quit smoking, request nicotine patches on discharge              Celeste Coleman, APRN-CNP

## 2023-12-15 NOTE — CARE PLAN
The patient's goals for the shift include      The clinical goals for the shift include safety

## 2023-12-15 NOTE — DISCHARGE SUMMARY
Discharge Diagnosis  Seizure (CMS/HCC)    Issues Requiring Follow-Up  Follow up with neurology and PCP in one week. No driving.    Test Results Pending At Discharge  Pending Labs       Order Current Status    Prolactin Collected (12/13/23 0617)    Vitamin D 25-Hydroxy,Total (for eval of Vitamin D levels) Collected (12/15/23 1600)            Hospital Course   Aron Colón is a 58 y.o. male with a past medical history of tobacco use and seizure last year who presented to the emergency department with complaints of a seizure.  Patient stated he remembers sitting down having a cup of coffee and waking up in the emergency department.  Stated that he did have a seizure about a year ago.  Per ED note, patient did not follow-up with neurology due to having no insurance.  History per the chart stated that patient was getting breakfast before going to work when he had a witnessed seizure.  He became tonic and fell forward striking his face then had clonic activity for an unknown period of time.  He was difficult to arouse.  911 was called and patient was brought to the emergency department.  Patient was postictal in the ED.  Per ED RN, patient had 2 more seizures in the emergency department.  During the seizure he did desat into the 80s and was placed on supplemental oxygen.  When he is awake his oxygen is 95% on room air. In the ED: Patient was given IV Ativan.  CBC and BMP were unremarkable.  His ammonia level was 69.  CT of the head, C-spine, and face were unremarkable.  UA negative.  Urine tox screen was positive for THC.  Alcohol negative.  EKG showed normal sinus rhythm.  His troponin was 9.  He was given a tetanus shot in the ED, Tylenol, and IV fluids.  Patient admitted to [facility] for further evaluation and treatment.    Neurology was consulted. EEG and MRI completed. MRI unremarkable. Started on IV Keppra, transitioned to oral. No further seizures. He did have wheezing, was treated with duonebs and  pulmicort, wheezing has resolved. Stable on room air. Will send home with rescue inhaler. Patient would like to quit smoking, nicotine patches prescribed. Prescription for Keppra sent to [facility] pharmacy. Symptoms have resolved. Cleared by neurology. Seen by PT/OT. No needs. Stable for discharge home today. No driving. Follow up with PCP and neurology outpatient.     Pertinent Physical Exam At Time of Discharge  Physical Exam  Vitals reviewed.   Constitutional:       Appearance: Normal appearance.   Eyes:      Extraocular Movements: Extraocular movements intact.      Conjunctiva/sclera: Conjunctivae normal.   Cardiovascular:      Rate and Rhythm: Normal rate and regular rhythm.   Pulmonary:      Effort: Pulmonary effort is normal.      Breath sounds: Normal breath sounds. No wheezing, rhonchi or rales.   Abdominal:      General: Bowel sounds are normal.      Palpations: Abdomen is soft.      Tenderness: There is no abdominal tenderness.   Musculoskeletal:         General: Normal range of motion.   Skin:     General: Skin is warm and dry.   Neurological:      General: No focal deficit present.      Mental Status: He is alert and oriented to person, place, and time.         Home Medications     Medication List      START taking these medications     albuterol 90 mcg/actuation inhaler; Commonly known as: Ventolin HFA;   Inhale 2 puffs every 4 hours if needed for wheezing or shortness of   breath.   levETIRAcetam 500 mg tablet; Commonly known as: Keppra; Take 1 tablet   (500 mg) by mouth 2 times a day.   nicotine 14 mg/24 hr patch; Commonly known as: Nicoderm CQ; Place 1   patch over 24 hours on the skin once every 24 hours.       Outpatient Follow-Up  No future appointments.    Time spent on discharge: 35 minutes    Celeste Coleman, NEWTON-CNP

## 2023-12-15 NOTE — PROGRESS NOTES
Occupational Therapy    Evaluation/Treatment    Patient Name: Aron Colón  MRN: [id number]  : 1965  Today's Date: 12/15/23  Time Calculation  Start Time: 851  Stop Time: 910  Time Calculation (min): 19 min       Assessment:  OT Assessment: 58 y.o. male admitted after having a seizure at home, pt was post-ictal upon arrival. Mentation improved and pt experienced another seizure in the ED.Patient is functioning close to his baseline, limited this date with decreased AROM RUE, pain, decreased strength, decreased endurance, will benefit from continued skilled OT intervention.  Prognosis: Good  Evaluation/Treatment Tolerance: Patient limited by fatigue, Patient limited by pain  Medical Staff Made Aware: Yes  End of Session Communication: Bedside nurse  End of Session Patient Position: Up in chair, Alarm on  OT Assessment Results: Decreased ADL status, Decreased upper extremity range of motion, Decreased endurance, Decreased functional mobility, Decreased IADLs  Prognosis: Good  Evaluation/Treatment Tolerance: Patient limited by fatigue, Patient limited by pain  Medical Staff Made Aware: Yes  Strengths: Premorbid level of function  Plan:  OT Frequency: 3 times per week  OT Discharge Recommendations: Low intensity level of continued care  OT Recommended Transfer Status: Assist of 1  OT - OK to Discharge: Yes       Subjective   Current Problem:  1. Seizure (CMS/HCC)  EEG      2. Facial injury, initial encounter        3. Altered mental status, unspecified altered mental status type        4. Injury of head, initial encounter        5. Tetanus toxoid vaccination administered at current visit          General:   OT Received On: 12/15/23  General  Reason for Referral: Weakness  Referred By: Celeste Coleman, NEWTON-CNP  Past Medical History Relevant to Rehab: seizure, left shoulder surgery  Family/Caregiver Present: No  Prior to Session Communication: Bedside nurse  Patient Position Received: Bed, 3 rail up, Alarm  off, not on at start of session  Preferred Learning Style: verbal  Precautions:  Hearing/Visual Limitations: wears glasses while on the computer  Medical Precautions: Fall precautions, Seizure precautions  Precautions Comment: Patient is recommended chair/bed alarm for safety, was instructed in calling nurse to use the restroom this date.  Vital Signs:     Pain:  Pain Assessment  Pain Assessment: 0-10  Pain Score: 6  Pain Type: Acute pain  Pain Location: Arm  Pain Orientation: Right  Pain Frequency: Constant/continuous  Pain Onset: Ongoing  Patient's Stated Pain Goal: No pain  Pain Interventions: Emotional support, Repositioned (Called nurse for pain medication.)  Response to Interventions: Patient reports decreased pain with movements this date.    Objective   Cognition:  Overall Cognitive Status: Within Functional Limits  Orientation Level: Oriented X4  Attention: Within Functional Limits  Memory: Within Funtional Limits  Problem Solving: Within Functional Limits  Safety/Judgement: Within Functional Limits    Home Living:  Type of Home: House  Lives With: Siblings  Home Adaptive Equipment: None  Home Layout: One level  Home Access: Level entry  Bathroom Shower/Tub: Tub/shower unit  Bathroom Toilet: Standard  Bathroom Equipment: None  Home Living Comments: Patient reports lives with his brother , one is disabled and needs assist, his other brother works.  Prior Function:  Level of [place]: Independent with homemaking with ambulation, Independent with ADLs and functional transfers  Receives Help From: Family  ADL Assistance: Independent  Homemaking Assistance: Independent  Ambulatory Assistance: Independent  Vocational: Full time employment  Hand Dominance: Right  Prior Function Comments: Patient does not drive, one of his brothers drives, denies falls, works full time .  IADL History:  Homemaking Responsibilities: Yes  Meal Prep Responsibility: Primary  Laundry Responsibility: Primary  Cleaning Responsibility:  Primary  Bill Paying/Finance Responsibility: Primary  Shopping Responsibility: Secondary  Mode of Transportation: Family  Occupation: Full time employment (in Maintenance)  ADL:  Eating Assistance: Independent  Eating Deficit: Setup  Grooming Assistance: Independent  Grooming Deficit: Setup  Bathing Assistance: Stand by  Bathing Deficit: Setup, Supervision/safety  UE Dressing Assistance: Minimal  UE Dressing Deficit:  (blaine gown)  LE Dressing Assistance: Stand by  LE Dressing Deficit: Supervision/safety, Don/doff L sock, Don/doff R sock  Toileting Assistance with Device: Stand by  Toileting Deficit: Supervison/safety, Grab bar use  Functional Assistance: Stand by (without AD)  Activities of Daily Living:      Grooming  Grooming Level of Assistance: Close supervision  Grooming Where Assessed: Standing sinkside  Grooming Comments: Close Supervision to complete face/teeth hygiene this date.    UE Bathing  UE Bathing Level of Assistance: Close supervision, Setup  UE Bathing Where Assessed: Standing sinkside  UE Bathing Comments: Patient was set up/Close Supervision to complete sponge bathe at the sink this date.      UE Dressing  UE Dressing Level of Assistance: Minimum assistance  UE Dressing Comments: minimal assist blaine/doff gown.    LE Dressing  LE Dressing: Yes  Sock Level of Assistance: Setup  LE Dressing Where Assessed: Edge of bed  LE Dressing Comments: Set up to blaine/doff socks .      Activity Tolerance:  Endurance: Decreased tolerance for upright activites  Activity Tolerance Comments: Patient reports fatigue after completing 8-10 min of functional standing this date, needing to rest.  Functional Standing Tolerance:  Time: 7-8 min  Activity: functional standing completing ADL tasks.  Bed Mobility/Transfers: Bed Mobility  Bed Mobility: Yes (Patient was able to get to the EOB this date with Close Supervision , no dizziness this date.)    Transfers  Transfer: Yes (Patient was able to complete bed, chair, toilet  transfers this date with Close Supervision without AD this date, no dizziness this date.)    Sitting Balance:  Static Sitting Balance  Static Sitting-Level of Assistance: Independent  Static Sitting-Comment/Number of Minutes: Good  Dynamic Sitting Balance  Dynamic Sitting-Balance: Lateral lean, Forward lean  Dynamic Sitting-Comments: Good  Standing Balance:  Static Standing Balance  Static Standing-Level of Assistance: Close supervision  Dynamic Standing Balance  Dynamic Standing-Balance: Lateral lean, Forward lean  Dynamic Standing-Comments: Close Supervision    Therapy/Activity:      Therapeutic Activity  Therapeutic Activity Performed: Yes (Patient was able to walk to the bathroom and back to the room this date with Close Supervision , no AD, no dizziness. Patient returned to the room and left sitting in the chair this date with the chair alarm.)      Vision:Vision - Basic Assessment  Current Vision: Wears glasses only for reading  Sensation:  Light Touch: No apparent deficits  Sensation Comment: denies paresthesias  Strength:    Coordination:  Movements are Fluid and Coordinated: No  Upper Body Coordination: Patient with decrease coordination R UE due to pain, decreased AROM this date.   Hand Function:  Hand Function  Gross Grasp: Functional  Coordination: Functional      Outcome Measures: [facility] Daily Activity  Putting on and taking off regular lower body clothing: A little  Bathing (including washing, rinsing, drying): A little  Putting on and taking off regular upper body clothing: A little  Toileting, which includes using toilet, bedpan or urinal: A little  Taking care of personal grooming such as brushing teeth: A little  Eating Meals: A little  Daily Activity - Total Score: 18            Goals:  Encounter Problems       Encounter Problems (Active)               OT Goals       Patient will be able to complete UB /LB dressing, bathing, toileting with modified independence using good safety. (Progressing)        Start:  12/15/23    Expected End:  12/29/23            Patient will be able to complete functional transfers/mobility with modified independence with good safety. (Progressing)       Start:  12/15/23    Expected End:  12/29/23            OT Goal 3 (Progressing)       Start:  12/15/23    Expected End:  12/29/23       Patient will be able to complete 15-20 min of functional standing with G balance in prep for ADL/I ADL tasks.            Transfers

## 2023-12-15 NOTE — PROGRESS NOTES
Patient upgraded to inpatient today, neurology following for seizures.    PT eval completed and there are no skilled needs identified.    Patient is uninsured, previous care coordinator provided patient with needed resources.   Planned discharge home with his brother, no needs      Home no needs   DC plan secure

## 2023-12-19 ENCOUNTER — PHARMACY VISIT (OUTPATIENT)
Dept: PHARMACY | Facility: CLINIC | Age: 58
End: 2023-12-19

## 2023-12-19 PROCEDURE — RXMED WILLOW AMBULATORY MEDICATION CHARGE

## 2024-10-17 ENCOUNTER — APPOINTMENT (OUTPATIENT)
Dept: RADIOLOGY | Facility: HOSPITAL | Age: 59
End: 2024-10-17
Payer: MEDICAID

## 2024-10-17 ENCOUNTER — APPOINTMENT (OUTPATIENT)
Dept: CARDIOLOGY | Facility: HOSPITAL | Age: 59
End: 2024-10-17
Payer: MEDICAID

## 2024-10-17 ENCOUNTER — HOSPITAL ENCOUNTER (INPATIENT)
Facility: HOSPITAL | Age: 59
LOS: 2 days | Discharge: HOME | End: 2024-10-19
Attending: STUDENT IN AN ORGANIZED HEALTH CARE EDUCATION/TRAINING PROGRAM | Admitting: INTERNAL MEDICINE
Payer: MEDICAID

## 2024-10-17 DIAGNOSIS — G40.901 STATUS EPILEPTICUS (MULTI): Primary | ICD-10-CM

## 2024-10-17 DIAGNOSIS — R79.89 ELEVATED LACTIC ACID LEVEL: ICD-10-CM

## 2024-10-17 DIAGNOSIS — R56.9 SEIZURE (MULTI): ICD-10-CM

## 2024-10-17 DIAGNOSIS — R79.89 ELEVATED TSH: ICD-10-CM

## 2024-10-17 LAB
ALBUMIN SERPL BCP-MCNC: 4.2 G/DL (ref 3.4–5)
ALP SERPL-CCNC: 101 U/L (ref 33–120)
ALT SERPL W P-5'-P-CCNC: 14 U/L (ref 10–52)
ANION GAP SERPL CALCULATED.3IONS-SCNC: 21 MMOL/L (ref 10–20)
APPEARANCE UR: CLEAR
AST SERPL W P-5'-P-CCNC: 17 U/L (ref 9–39)
ATRIAL RATE: 119 BPM
BASOPHILS # BLD AUTO: 0.08 X10*3/UL (ref 0–0.1)
BASOPHILS NFR BLD AUTO: 0.4 %
BILIRUB SERPL-MCNC: 0.4 MG/DL (ref 0–1.2)
BILIRUB UR STRIP.AUTO-MCNC: NEGATIVE MG/DL
BUN SERPL-MCNC: 12 MG/DL (ref 6–23)
CALCIUM SERPL-MCNC: 9.3 MG/DL (ref 8.6–10.3)
CHLORIDE SERPL-SCNC: 100 MMOL/L (ref 98–107)
CO2 SERPL-SCNC: 20 MMOL/L (ref 21–32)
COLOR UR: ABNORMAL
CREAT SERPL-MCNC: 1.28 MG/DL (ref 0.5–1.3)
EGFRCR SERPLBLD CKD-EPI 2021: 64 ML/MIN/1.73M*2
EOSINOPHIL # BLD AUTO: 0.16 X10*3/UL (ref 0–0.7)
EOSINOPHIL NFR BLD AUTO: 0.8 %
ERYTHROCYTE [DISTWIDTH] IN BLOOD BY AUTOMATED COUNT: 15.8 % (ref 11.5–14.5)
GLUCOSE SERPL-MCNC: 141 MG/DL (ref 74–99)
GLUCOSE UR STRIP.AUTO-MCNC: NORMAL MG/DL
HCT VFR BLD AUTO: 48.9 % (ref 41–52)
HGB BLD-MCNC: 15.6 G/DL (ref 13.5–17.5)
IMM GRANULOCYTES # BLD AUTO: 0.12 X10*3/UL (ref 0–0.7)
IMM GRANULOCYTES NFR BLD AUTO: 0.6 % (ref 0–0.9)
KETONES UR STRIP.AUTO-MCNC: ABNORMAL MG/DL
LACTATE SERPL-SCNC: 1.6 MMOL/L (ref 0.4–2)
LACTATE SERPL-SCNC: 13.8 MMOL/L (ref 0.4–2)
LACTATE SERPL-SCNC: 2.9 MMOL/L (ref 0.4–2)
LEUKOCYTE ESTERASE UR QL STRIP.AUTO: NEGATIVE
LYMPHOCYTES # BLD AUTO: 2.58 X10*3/UL (ref 1.2–4.8)
LYMPHOCYTES NFR BLD AUTO: 13.4 %
MAGNESIUM SERPL-MCNC: 2.39 MG/DL (ref 1.6–2.4)
MCH RBC QN AUTO: 28.2 PG (ref 26–34)
MCHC RBC AUTO-ENTMCNC: 31.9 G/DL (ref 32–36)
MCV RBC AUTO: 88 FL (ref 80–100)
MONOCYTES # BLD AUTO: 0.86 X10*3/UL (ref 0.1–1)
MONOCYTES NFR BLD AUTO: 4.5 %
NEUTROPHILS # BLD AUTO: 15.39 X10*3/UL (ref 1.2–7.7)
NEUTROPHILS NFR BLD AUTO: 80.3 %
NITRITE UR QL STRIP.AUTO: NEGATIVE
NRBC BLD-RTO: 0 /100 WBCS (ref 0–0)
PH UR STRIP.AUTO: 5.5 [PH]
PLATELET # BLD AUTO: 308 X10*3/UL (ref 150–450)
POTASSIUM SERPL-SCNC: 4.1 MMOL/L (ref 3.5–5.3)
PROLACTIN SERPL-MCNC: 17.9 UG/L (ref 2–18)
PROT SERPL-MCNC: 7.2 G/DL (ref 6.4–8.2)
PROT UR STRIP.AUTO-MCNC: NEGATIVE MG/DL
Q ONSET: 210 MS
QRS COUNT: 19 BEATS
QRS DURATION: 96 MS
QT INTERVAL: 328 MS
QTC CALCULATION(BAZETT): 457 MS
QTC FREDERICIA: 410 MS
R AXIS: 99 DEGREES
RBC # BLD AUTO: 5.54 X10*6/UL (ref 4.5–5.9)
RBC # UR STRIP.AUTO: ABNORMAL /UL
RBC #/AREA URNS AUTO: NORMAL /HPF
SODIUM SERPL-SCNC: 137 MMOL/L (ref 136–145)
SP GR UR STRIP.AUTO: 1.02
T AXIS: 31 DEGREES
T OFFSET: 374 MS
T4 FREE SERPL-MCNC: 0.87 NG/DL (ref 0.61–1.12)
TSH SERPL-ACNC: 4.53 MIU/L (ref 0.44–3.98)
UROBILINOGEN UR STRIP.AUTO-MCNC: NORMAL MG/DL
VENTRICULAR RATE: 117 BPM
WBC # BLD AUTO: 19.2 X10*3/UL (ref 4.4–11.3)
WBC #/AREA URNS AUTO: NORMAL /HPF

## 2024-10-17 PROCEDURE — 83605 ASSAY OF LACTIC ACID: CPT | Performed by: PHYSICIAN ASSISTANT

## 2024-10-17 PROCEDURE — 96366 THER/PROPH/DIAG IV INF ADDON: CPT

## 2024-10-17 PROCEDURE — 36415 COLL VENOUS BLD VENIPUNCTURE: CPT | Performed by: PHYSICIAN ASSISTANT

## 2024-10-17 PROCEDURE — 80053 COMPREHEN METABOLIC PANEL: CPT | Performed by: STUDENT IN AN ORGANIZED HEALTH CARE EDUCATION/TRAINING PROGRAM

## 2024-10-17 PROCEDURE — 2500000004 HC RX 250 GENERAL PHARMACY W/ HCPCS (ALT 636 FOR OP/ED)

## 2024-10-17 PROCEDURE — 81003 URINALYSIS AUTO W/O SCOPE: CPT | Performed by: PHYSICIAN ASSISTANT

## 2024-10-17 PROCEDURE — 96365 THER/PROPH/DIAG IV INF INIT: CPT

## 2024-10-17 PROCEDURE — 84439 ASSAY OF FREE THYROXINE: CPT | Performed by: PHYSICIAN ASSISTANT

## 2024-10-17 PROCEDURE — 70450 CT HEAD/BRAIN W/O DYE: CPT

## 2024-10-17 PROCEDURE — 71045 X-RAY EXAM CHEST 1 VIEW: CPT | Performed by: RADIOLOGY

## 2024-10-17 PROCEDURE — 1210000001 HC SEMI-PRIVATE ROOM DAILY

## 2024-10-17 PROCEDURE — 70450 CT HEAD/BRAIN W/O DYE: CPT | Performed by: RADIOLOGY

## 2024-10-17 PROCEDURE — 36415 COLL VENOUS BLD VENIPUNCTURE: CPT | Performed by: STUDENT IN AN ORGANIZED HEALTH CARE EDUCATION/TRAINING PROGRAM

## 2024-10-17 PROCEDURE — 2500000004 HC RX 250 GENERAL PHARMACY W/ HCPCS (ALT 636 FOR OP/ED): Performed by: PHYSICIAN ASSISTANT

## 2024-10-17 PROCEDURE — 84443 ASSAY THYROID STIM HORMONE: CPT | Performed by: PHYSICIAN ASSISTANT

## 2024-10-17 PROCEDURE — G0378 HOSPITAL OBSERVATION PER HR: HCPCS

## 2024-10-17 PROCEDURE — 96375 TX/PRO/DX INJ NEW DRUG ADDON: CPT

## 2024-10-17 PROCEDURE — 84146 ASSAY OF PROLACTIN: CPT | Mod: WESLAB | Performed by: PHYSICIAN ASSISTANT

## 2024-10-17 PROCEDURE — 99285 EMERGENCY DEPT VISIT HI MDM: CPT

## 2024-10-17 PROCEDURE — 87040 BLOOD CULTURE FOR BACTERIA: CPT | Mod: WESLAB | Performed by: PHYSICIAN ASSISTANT

## 2024-10-17 PROCEDURE — 85025 COMPLETE CBC W/AUTO DIFF WBC: CPT | Performed by: STUDENT IN AN ORGANIZED HEALTH CARE EDUCATION/TRAINING PROGRAM

## 2024-10-17 PROCEDURE — 93005 ELECTROCARDIOGRAM TRACING: CPT

## 2024-10-17 PROCEDURE — 83735 ASSAY OF MAGNESIUM: CPT | Performed by: PHYSICIAN ASSISTANT

## 2024-10-17 PROCEDURE — 71045 X-RAY EXAM CHEST 1 VIEW: CPT

## 2024-10-17 RX ORDER — ACETAMINOPHEN 325 MG/1
650 TABLET ORAL EVERY 6 HOURS PRN
Status: DISCONTINUED | OUTPATIENT
Start: 2024-10-17 | End: 2024-10-19 | Stop reason: HOSPADM

## 2024-10-17 RX ORDER — LORAZEPAM 2 MG/ML
2 INJECTION INTRAMUSCULAR ONCE
Status: COMPLETED | OUTPATIENT
Start: 2024-10-17 | End: 2024-10-17

## 2024-10-17 RX ORDER — LORAZEPAM 2 MG/ML
INJECTION INTRAMUSCULAR
Status: COMPLETED
Start: 2024-10-17 | End: 2024-10-17

## 2024-10-17 RX ORDER — CEFTRIAXONE 1 G/50ML
1 INJECTION, SOLUTION INTRAVENOUS ONCE
Status: COMPLETED | OUTPATIENT
Start: 2024-10-17 | End: 2024-10-17

## 2024-10-17 RX ORDER — LEVETIRACETAM 10 MG/ML
1000 INJECTION INTRAVASCULAR EVERY 24 HOURS
Status: DISCONTINUED | OUTPATIENT
Start: 2024-10-18 | End: 2024-10-18

## 2024-10-17 RX ORDER — ONDANSETRON HYDROCHLORIDE 2 MG/ML
4 INJECTION, SOLUTION INTRAVENOUS EVERY 6 HOURS PRN
Status: DISCONTINUED | OUTPATIENT
Start: 2024-10-17 | End: 2024-10-19 | Stop reason: HOSPADM

## 2024-10-17 RX ORDER — LEVETIRACETAM 10 MG/ML
1000 INJECTION INTRAVASCULAR ONCE
Status: COMPLETED | OUTPATIENT
Start: 2024-10-17 | End: 2024-10-17

## 2024-10-17 RX ADMIN — CEFTRIAXONE SODIUM 1 G: 1 INJECTION, SOLUTION INTRAVENOUS at 10:26

## 2024-10-17 RX ADMIN — LORAZEPAM 2 MG: 2 INJECTION INTRAMUSCULAR; INTRAVENOUS at 09:55

## 2024-10-17 RX ADMIN — LORAZEPAM 2 MG: 2 INJECTION INTRAMUSCULAR at 09:55

## 2024-10-17 RX ADMIN — LEVETIRACETAM 1000 MG: 10 INJECTION INTRAVENOUS at 10:11

## 2024-10-17 RX ADMIN — SODIUM CHLORIDE 2097 ML: 900 INJECTION, SOLUTION INTRAVENOUS at 10:11

## 2024-10-17 SDOH — SOCIAL STABILITY: SOCIAL INSECURITY: HAVE YOU HAD THOUGHTS OF HARMING ANYONE ELSE?: NO

## 2024-10-17 SDOH — SOCIAL STABILITY: SOCIAL INSECURITY: WITHIN THE LAST YEAR, HAVE YOU BEEN HUMILIATED OR EMOTIONALLY ABUSED IN OTHER WAYS BY YOUR PARTNER OR EX-PARTNER?: NO

## 2024-10-17 SDOH — ECONOMIC STABILITY: FOOD INSECURITY
WITHIN THE PAST 12 MONTHS, YOU WORRIED THAT YOUR FOOD WOULD RUN OUT BEFORE YOU GOT THE MONEY TO BUY MORE.: PATIENT UNABLE TO ANSWER

## 2024-10-17 SDOH — ECONOMIC STABILITY: HOUSING INSECURITY: AT ANY TIME IN THE PAST 12 MONTHS, WERE YOU HOMELESS OR LIVING IN A SHELTER (INCLUDING NOW)?: NO

## 2024-10-17 SDOH — ECONOMIC STABILITY: INCOME INSECURITY: IN THE PAST 12 MONTHS HAS THE ELECTRIC, GAS, OIL, OR WATER COMPANY THREATENED TO SHUT OFF SERVICES IN YOUR HOME?: NO

## 2024-10-17 SDOH — SOCIAL STABILITY: SOCIAL INSECURITY: HAS ANYONE EVER THREATENED TO HURT YOUR FAMILY OR YOUR PETS?: NO

## 2024-10-17 SDOH — SOCIAL STABILITY: SOCIAL INSECURITY: DO YOU FEEL UNSAFE GOING BACK TO THE PLACE WHERE YOU ARE LIVING?: NO

## 2024-10-17 SDOH — ECONOMIC STABILITY: FOOD INSECURITY: HOW HARD IS IT FOR YOU TO PAY FOR THE VERY BASICS LIKE FOOD, HOUSING, MEDICAL CARE, AND HEATING?: NOT VERY HARD

## 2024-10-17 SDOH — ECONOMIC STABILITY: FOOD INSECURITY: WITHIN THE PAST 12 MONTHS, YOU WORRIED THAT YOUR FOOD WOULD RUN OUT BEFORE YOU GOT THE MONEY TO BUY MORE.: NEVER TRUE

## 2024-10-17 SDOH — ECONOMIC STABILITY: FOOD INSECURITY: WITHIN THE PAST 12 MONTHS, THE FOOD YOU BOUGHT JUST DIDN'T LAST AND YOU DIDN'T HAVE MONEY TO GET MORE.: NEVER TRUE

## 2024-10-17 SDOH — ECONOMIC STABILITY: TRANSPORTATION INSECURITY
IN THE PAST 12 MONTHS, HAS LACK OF TRANSPORTATION KEPT YOU FROM MEDICAL APPOINTMENTS OR FROM GETTING MEDICATIONS?: PATIENT UNABLE TO ANSWER

## 2024-10-17 SDOH — SOCIAL STABILITY: SOCIAL INSECURITY: DO YOU FEEL ANYONE HAS EXPLOITED OR TAKEN ADVANTAGE OF YOU FINANCIALLY OR OF YOUR PERSONAL PROPERTY?: NO

## 2024-10-17 SDOH — ECONOMIC STABILITY: TRANSPORTATION INSECURITY: IN THE PAST 12 MONTHS, HAS LACK OF TRANSPORTATION KEPT YOU FROM MEDICAL APPOINTMENTS OR FROM GETTING MEDICATIONS?: NO

## 2024-10-17 SDOH — SOCIAL STABILITY: SOCIAL INSECURITY: WITHIN THE LAST YEAR, HAVE YOU BEEN AFRAID OF YOUR PARTNER OR EX-PARTNER?: NO

## 2024-10-17 SDOH — SOCIAL STABILITY: SOCIAL INSECURITY: ARE THERE ANY APPARENT SIGNS OF INJURIES/BEHAVIORS THAT COULD BE RELATED TO ABUSE/NEGLECT?: NO

## 2024-10-17 SDOH — ECONOMIC STABILITY: HOUSING INSECURITY: IN THE LAST 12 MONTHS, WAS THERE A TIME WHEN YOU WERE NOT ABLE TO PAY THE MORTGAGE OR RENT ON TIME?: NO

## 2024-10-17 SDOH — SOCIAL STABILITY: SOCIAL INSECURITY: ARE YOU OR HAVE YOU BEEN THREATENED OR ABUSED PHYSICALLY, EMOTIONALLY, OR SEXUALLY BY ANYONE?: NO

## 2024-10-17 SDOH — SOCIAL STABILITY: SOCIAL INSECURITY
WITHIN THE LAST YEAR, HAVE YOU BEEN RAPED OR FORCED TO HAVE ANY KIND OF SEXUAL ACTIVITY BY YOUR PARTNER OR EX-PARTNER?: NO

## 2024-10-17 SDOH — SOCIAL STABILITY: SOCIAL INSECURITY: DOES ANYONE TRY TO KEEP YOU FROM HAVING/CONTACTING OTHER FRIENDS OR DOING THINGS OUTSIDE YOUR HOME?: NO

## 2024-10-17 SDOH — ECONOMIC STABILITY: HOUSING INSECURITY
IN THE LAST 12 MONTHS, WAS THERE A TIME WHEN YOU WERE NOT ABLE TO PAY THE MORTGAGE OR RENT ON TIME?: PATIENT UNABLE TO ANSWER

## 2024-10-17 SDOH — ECONOMIC STABILITY: HOUSING INSECURITY: AT ANY TIME IN THE PAST 12 MONTHS, WERE YOU HOMELESS OR LIVING IN A SHELTER (INCLUDING NOW)?: PATIENT UNABLE TO ANSWER

## 2024-10-17 SDOH — ECONOMIC STABILITY: HOUSING INSECURITY: IN THE PAST 12 MONTHS, HOW MANY TIMES HAVE YOU MOVED WHERE YOU WERE LIVING?: 1

## 2024-10-17 SDOH — SOCIAL STABILITY: SOCIAL INSECURITY: HAVE YOU HAD ANY THOUGHTS OF HARMING ANYONE ELSE?: NO

## 2024-10-17 SDOH — SOCIAL STABILITY: SOCIAL INSECURITY: ABUSE: ADULT

## 2024-10-17 SDOH — ECONOMIC STABILITY: FOOD INSECURITY
HOW HARD IS IT FOR YOU TO PAY FOR THE VERY BASICS LIKE FOOD, HOUSING, MEDICAL CARE, AND HEATING?: PATIENT UNABLE TO ANSWER

## 2024-10-17 SDOH — ECONOMIC STABILITY: FOOD INSECURITY
WITHIN THE PAST 12 MONTHS, THE FOOD YOU BOUGHT JUST DIDN'T LAST AND YOU DIDN'T HAVE MONEY TO GET MORE.: PATIENT UNABLE TO ANSWER

## 2024-10-17 ASSESSMENT — ACTIVITIES OF DAILY LIVING (ADL)
ADEQUATE_TO_COMPLETE_ADL: YES
LACK_OF_TRANSPORTATION: NO
PATIENT'S MEMORY ADEQUATE TO SAFELY COMPLETE DAILY ACTIVITIES?: YES
LACK_OF_TRANSPORTATION: PATIENT UNABLE TO ANSWER
WALKS IN HOME: INDEPENDENT
DRESSING YOURSELF: INDEPENDENT
TOILETING: INDEPENDENT
GROOMING: INDEPENDENT
JUDGMENT_ADEQUATE_SAFELY_COMPLETE_DAILY_ACTIVITIES: YES
FEEDING YOURSELF: INDEPENDENT
BATHING: INDEPENDENT
HEARING - LEFT EAR: FUNCTIONAL
HEARING - RIGHT EAR: FUNCTIONAL

## 2024-10-17 ASSESSMENT — COGNITIVE AND FUNCTIONAL STATUS - GENERAL
PATIENT BASELINE BEDBOUND: NO
DAILY ACTIVITIY SCORE: 24
MOBILITY SCORE: 24
MOBILITY SCORE: 24
DAILY ACTIVITIY SCORE: 24

## 2024-10-17 ASSESSMENT — LIFESTYLE VARIABLES
AUDIT-C TOTAL SCORE: -1
HOW OFTEN DO YOU HAVE A DRINK CONTAINING ALCOHOL: PATIENT UNABLE TO ANSWER
HOW MANY STANDARD DRINKS CONTAINING ALCOHOL DO YOU HAVE ON A TYPICAL DAY: PATIENT UNABLE TO ANSWER
SKIP TO QUESTIONS 9-10: 0
AUDIT-C TOTAL SCORE: -1
HOW OFTEN DO YOU HAVE 6 OR MORE DRINKS ON ONE OCCASION: PATIENT UNABLE TO ANSWER

## 2024-10-17 ASSESSMENT — PATIENT HEALTH QUESTIONNAIRE - PHQ9
1. LITTLE INTEREST OR PLEASURE IN DOING THINGS: NOT AT ALL
2. FEELING DOWN, DEPRESSED OR HOPELESS: NOT AT ALL
SUM OF ALL RESPONSES TO PHQ9 QUESTIONS 1 & 2: 0

## 2024-10-17 ASSESSMENT — PAIN - FUNCTIONAL ASSESSMENT: PAIN_FUNCTIONAL_ASSESSMENT: 0-10

## 2024-10-17 ASSESSMENT — PAIN SCALES - WONG BAKER: WONGBAKER_NUMERICALRESPONSE: NO HURT

## 2024-10-17 ASSESSMENT — PAIN SCALES - PAIN ASSESSMENT IN ADVANCED DEMENTIA (PAINAD)
BODYLANGUAGE: RELAXED
BREATHING: NORMAL
FACIALEXPRESSION: SMILING OR INEXPRESSIVE

## 2024-10-17 ASSESSMENT — PAIN SCALES - GENERAL
PAINLEVEL_OUTOF10: 0 - NO PAIN
PAINLEVEL_OUTOF10: 0 - NO PAIN

## 2024-10-17 ASSESSMENT — COLUMBIA-SUICIDE SEVERITY RATING SCALE - C-SSRS
2. HAVE YOU ACTUALLY HAD ANY THOUGHTS OF KILLING YOURSELF?: NO
1. IN THE PAST MONTH, HAVE YOU WISHED YOU WERE DEAD OR WISHED YOU COULD GO TO SLEEP AND NOT WAKE UP?: NO
6. HAVE YOU EVER DONE ANYTHING, STARTED TO DO ANYTHING, OR PREPARED TO DO ANYTHING TO END YOUR LIFE?: NO

## 2024-10-17 ASSESSMENT — ENCOUNTER SYMPTOMS: SEIZURES: 1

## 2024-10-17 NOTE — ED PROVIDER NOTES
HPI   Chief Complaint   Patient presents with    Seizures       HPI  59-year-old male here for possible seizure, history of seizure disorder.  Apparently family witnessed 3 seizures without complete return to baseline and on the third 1 called 911.  On arrival patient was not actively seizing but was postictal, the patient was placed into room #4 in the emergency department.  Shortly after his arrival nursing requested my immediate evaluation in the room as patient started having another seizure.  On my arrival he had tonic-clonic seizure activity with muscle tightening, he was diaphoretic, unresponsive.  Ativan given.  No obvious sign of injury or trauma identified.      Patient History   Past Medical History:   Diagnosis Date    Seizures (Multi)      Past Surgical History:   Procedure Laterality Date    SHOULDER SURGERY Left      No family history on file.  Social History     Tobacco Use    Smoking status: Every Day     Types: Cigarettes    Smokeless tobacco: Never   Substance Use Topics    Alcohol use: Never    Drug use: Yes     Types: Marijuana       Physical Exam   ED Triage Vitals [10/17/24 0938]   Temperature Heart Rate Respirations BP   36.5 °C (97.7 °F) (!) 119 20 (!) 186/85      Pulse Ox Temp src Heart Rate Source Patient Position   (!) 92 % -- -- --      BP Location FiO2 (%)     -- --       Physical Exam  PHYSICAL EXAMINATION    GENERAL APPEARANCE: Awake and alert.  Diaphoretic    VITAL SIGNS: As per the nurses' triage record.     HEENT: Normocephalic, atraumatic. Extraocular muscles are intact. Pupils equal round and reactive to light. Conjunctiva are pink. Negative scleral icterus. Mucous membranes are moist. Tongue in the midline. Pharynx was without erythema or exudates, uvula midline    NECK: Soft Nontender and supple, full gross ROM, no meningeal signs.    CHEST: Nontender to palpation. Clear to auscultation bilaterally. No rales, rhonchi, or wheezing.     HEART: S1, S2.  Tachycardic    ABDOMEN:  Soft, nontender, nondistended, positive bowel sounds, no palpable masses.    MUSCULOSKELETAL: The calves are nontender to palpation. Full gross active range of motion. Ambulating on own with no acute difficulties     NEUROLOGICAL: Postictal, unable to evaluate further neurologic status    IMMUNOLOGICAL: No lymphatic streaking noted     DERM: No petechiae, rashes, or ecchymoses.    ED Course & MDM   ED Course as of 10/17/24 1220   Thu Oct 17, 2024   1015 Doing well no current or continued seizure activity since medication [AP]   1026 WBC(!): 19.2  Elevated from 10 months ago [AP]   1108 Neuro consulted, agrees to plan she will be on consult   [AP]   1115 Lactate(!!): 13.8  Likely related to seizure activity. [AP]   1215 Spoke to rafa who agrees to hospitalize to his service [AP]   1216 Thyroid Stimulating Hormone(!): 4.53  Elevated from last draw [AP]      ED Course User Index  [AP] Jl Hayes PA-C         Diagnoses as of 10/17/24 1220   Status epilepticus (Multi)   Elevated TSH   Elevated lactic acid level                 No data recorded     Dayana Coma Scale Score: 13 (10/17/24 0936 : Catalina Hernandez, PARDEEP)                           Medical Decision Making  Parts of this chart have been completed using voice recognition software. Please excuse any errors of transcription.  My thought process and reason for plan has been formulated from the time that I saw the patient until the time of disposition and is not specific to one specific moment during their visit and furthermore my MDM encompasses this entire chart and not only this text box.      HPI: Detailed above.    Exam: A medically appropriate exam performed, outlined above, given the known history and presentation.    History Limited by: The patient's condition    History obtained from: Patient and EMS report    External/internal records reviewed: Reviewed hospitalization record from 12/13/2023 for which he was hospitalized for seizure activity  with Keppra initiated    Social Determinants of Health considered during this visit: Lives at home    Chronic conditions impacting care: Seizure disorder    Medications given during visit:  Medications   sodium chloride 0.9 % bolus 2,097 mL (2,097 mL intravenous New Bag 10/17/24 1011)   LORazepam (Ativan) injection 2 mg (2 mg intravenous Given 10/17/24 0955)   LORazepam (Ativan) injection 2 mg (2 mg intravenous Given 10/17/24 0955)   levETIRAcetam (Keppra) 1,000 mg in sodium chloride (iso)  mL (0 mg intravenous Stopped 10/17/24 1027)   cefTRIAXone (Rocephin) 1 g in dextrose (iso) IV 50 mL (1 g intravenous New Bag 10/17/24 1025)        Diagnostic/tests  Labs Reviewed   CBC WITH AUTO DIFFERENTIAL - Abnormal       Result Value    WBC 19.2 (*)     nRBC 0.0      RBC 5.54      Hemoglobin 15.6      Hematocrit 48.9      MCV 88      MCH 28.2      MCHC 31.9 (*)     RDW 15.8 (*)     Platelets 308      Neutrophils % 80.3      Immature Granulocytes %, Automated 0.6      Lymphocytes % 13.4      Monocytes % 4.5      Eosinophils % 0.8      Basophils % 0.4      Neutrophils Absolute 15.39 (*)     Immature Granulocytes Absolute, Automated 0.12      Lymphocytes Absolute 2.58      Monocytes Absolute 0.86      Eosinophils Absolute 0.16      Basophils Absolute 0.08     COMPREHENSIVE METABOLIC PANEL - Abnormal    Glucose 141 (*)     Sodium 137      Potassium 4.1      Chloride 100      Bicarbonate 20 (*)     Anion Gap 21 (*)     Urea Nitrogen 12      Creatinine 1.28      eGFR 64      Calcium 9.3      Albumin 4.2      Alkaline Phosphatase 101      Total Protein 7.2      AST 17      Bilirubin, Total 0.4      ALT 14     LACTATE - Abnormal    Lactate 13.8 (*)     Narrative:     Venipuncture immediately after or during the administration of Metamizole may lead to falsely low results. Testing should be performed immediately prior to Metamizole dosing.   TSH WITH REFLEX TO FREE T4 IF ABNORMAL - Abnormal    Thyroid Stimulating Hormone 4.53  (*)     Narrative:     TSH testing is performed using different testing methodology at [facility] than at other [facility]. Direct result comparisons should only be made within the same method.     MAGNESIUM - Normal    Magnesium 2.39     THYROXINE, FREE - Normal    Thyroxine, Free 0.87      Narrative:     Thyroxine Free testing is performed using different testing methodology at [facility] than at other [facility]. Direct result comparisons should only be made within the same method.    Biotin can cause falsely elevated free T4 results. Patients taking a Biotin dose of up to 10 mg/day should refrain from taking Biotin for 24 hours before sample collection. Patient taking a Biotin dose of >10 mg/day should consult with their physician or the laboratory before the blood draw.   BLOOD CULTURE   BLOOD CULTURE   URINALYSIS WITH REFLEX CULTURE AND MICROSCOPIC    Narrative:     The following orders were created for panel order Urinalysis with Reflex Culture and Microscopic.  Procedure                               Abnormality         Status                     ---------                               -----------         ------                     Urinalysis with Reflex C...[[id number]]                                                 Extra Urine Gray Tube[[id number]]                                                         Please view results for these tests on the individual orders.   URINALYSIS WITH REFLEX CULTURE AND MICROSCOPIC   EXTRA URINE GRAY TUBE   PROLACTIN   LACTATE      CT head wo IV contrast   Final Result   Negative exam.        MACRO:   None        Signed by: Armani Barger 10/17/2024 10:55 AM   Dictation workstation:   [id number]      XR chest 1 view   Final Result   Pleural thickening blunting left lateral costophrenic angle with   pleural pericardial adhesion on the left seen. There is some   fibrosis/scarring in the left mid lung zone unchanged.        Signed by:  Abril Aly 10/17/2024 10:14 AM   Dictation workstation:   [id number]           EKG: Obtained read by attending physician reviewed myself and per my interpretation no sign of STEMI criteria      Case discussed with: neuro and dr craig    Prescription medications considered: keppra and ativan    Considerations/further MDM:  I estimate there is low risk for severe head injury requiring admission or transfer to another facility.  I have considered: Fracture, dislocation, facial fracture injury, ocular involvement, cauda equina, central cord syndrome, epidural mass lesion, meningitis, spinal stenosis, disc herniation, intracranial hemorrhage or hematoma, cavernous thrombosis, stroke, concussion     Estimate a very low likelihood for sepsis, I believe that the patient's vital sign abnormalities as well as the lactic acidosis is all related to the seizure activity.  However 30 mL/kg fluid was given and a broad-spectrum antibiotic to cover for such.  And screen for sepsis despite a likelihood that this is seizure related              SEP-1 CORE MEASURE DATA    Visit Vitals  /71   Pulse 82   Temp 36.5 °C (97.7 °F)   Resp (!) 24        WBC   Date/Time Value Ref Range Status   10/17/2024 09:38 AM 19.2 (H) 4.4 - 11.3 x10*3/uL Final     Lactate   Date/Time Value Ref Range Status   10/17/2024 10:25 AM 13.8 (HH) 0.4 - 2.0 mmol/L Final     Creatinine   Date/Time Value Ref Range Status   10/17/2024 09:38 AM 1.28 0.50 - 1.30 mg/dL Final     Bilirubin, Total   Date/Time Value Ref Range Status   10/17/2024 09:38 AM 0.4 0.0 - 1.2 mg/dL Final     Platelets   Date/Time Value Ref Range Status   10/17/2024 09:38  150 - 450 x10*3/uL Final       Fluid Resuscitation Rationale: at least 30mL/kg based on entered actual body weight at time of triage    I performed a sepsis reperfusion exam on Aron Colón on 10/17/24 at 1219                   Procedure  Procedures     Jl Hayes PA-C  10/17/24 1220

## 2024-10-17 NOTE — CARE PLAN
The patient's goals for the shift include      The clinical goals for the shift include maintain seizure precautions      Problem: Pain - Adult  Goal: Verbalizes/displays adequate comfort level or baseline comfort level  10/17/2024 1822 by Catrachita Toscano RN  Outcome: Progressing  10/17/2024 1821 by Catrachita Toscano RN  Outcome: Progressing  10/17/2024 1820 by Catrachita Toscano RN  Outcome: Progressing     Problem: Safety - Adult  Goal: Free from fall injury  10/17/2024 1822 by Catrachita Toscano RN  Outcome: Progressing  10/17/2024 1821 by Catrachita Toscano RN  Outcome: Progressing  10/17/2024 1820 by Catrachita Toscano RN  Outcome: Progressing     Problem: Discharge Planning  Goal: Discharge to home or other facility with appropriate resources  10/17/2024 1822 by Catrachita Toscano RN  Outcome: Progressing  10/17/2024 1821 by Catrachita Toscano RN  Outcome: Progressing  10/17/2024 1820 by Catrachita Toscano RN  Outcome: Progressing     Problem: Chronic Conditions and Co-morbidities  Goal: Patient's chronic conditions and co-morbidity symptoms are monitored and maintained or improved  10/17/2024 1822 by Catrachita Toscano RN  Outcome: Progressing  10/17/2024 1821 by Catrachita Toscano RN  Outcome: Progressing  10/17/2024 1820 by Catrachita Tocsano RN  Outcome: Progressing     Problem: Fall/Injury  Goal: Not fall by end of shift  10/17/2024 1822 by Catrachita Toscano RN  Outcome: Progressing  10/17/2024 1821 by Catrachita Toscano RN  Outcome: Progressing  10/17/2024 1820 by Catrachita Toscano RN  Outcome: Progressing  Goal: Be free from injury by end of the shift  10/17/2024 1822 by Catrachita Toscano RN  Outcome: Progressing  10/17/2024 1821 by Catrachita Toscano RN  Outcome: Progressing  10/17/2024 1820 by Catrachita Toscano RN  Outcome: Progressing  Goal: Verbalize understanding of personal risk factors for fall in the hospital  10/17/2024 1822 by Catrachita Toscano RN  Outcome:  Progressing  10/17/2024 1821 by Catrachita Toscano RN  Outcome: Progressing  10/17/2024 1820 by Catrachita Toscano RN  Outcome: Progressing  Goal: Verbalize understanding of risk factor reduction measures to prevent injury from fall in the home  10/17/2024 1822 by Catrachita Toscano RN  Outcome: Progressing  10/17/2024 1821 by Catrachita Toscano RN  Outcome: Progressing  10/17/2024 1820 by Catrachita Toscano RN  Outcome: Progressing  Goal: Use assistive devices by end of the shift  10/17/2024 1822 by Catrachita Toscano RN  Outcome: Progressing  10/17/2024 1821 by Catrachita Toscano RN  Outcome: Progressing  10/17/2024 1820 by Catrachita Toscano RN  Outcome: Progressing  Goal: Pace activities to prevent fatigue by end of the shift  10/17/2024 1822 by Catrachita Toscano RN  Outcome: Progressing  10/17/2024 1821 by Catrachita Toscano RN  Outcome: Progressing  10/17/2024 1820 by Catrachita Toscano RN  Outcome: Progressing

## 2024-10-17 NOTE — PROGRESS NOTES
Pharmacy Medication History Review    Aron Colón is a 59 y.o. male admitted for Status epilepticus (Multi). Pharmacy reviewed the patient's jtyii-oh-cjmyadmen medications and allergies for accuracy.    Medications ADDED:  none  Medications CHANGED:  Nicotine patch - not taking   Medications REMOVED:   none     The list below reflects the updated PTA list. Comments regarding how patient may be taking medications differently can be found in the Admit Orders Activity  Prior to Admission Medications   Prescriptions Last Dose Informant   albuterol (Ventolin HFA) 90 mcg/actuation inhaler Unknown self   Sig: Inhale 2 puffs every 4 hours if needed for wheezing or shortness of breath.   levETIRAcetam (Keppra) 500 mg tablet Unknown self   Sig: Take 1 tablet (500 mg) by mouth 2 times a day.      Facility-Administered Medications: None        The list below reflects the updated allergy list. Please review each documented allergy for additional clarification and justification.  Allergies  Reviewed by Catalina Hernandez RN on 10/17/2024   No Known Allergies         Pharmacy has been updated to [facility].    Sources used to complete the med history include dispense history.    Below are additional concerns with the patient's PTA list.  Patient very drowsy but shook hist head and mumbled no when I asked if he takes medication at home. I did ask any tylenol or anything over the counter and he shook his head no again. Last dispense on Keppra is showing February of 2024.    Andie Simms, Sindi-Adv  Please reach out via [employer] Secure Chat for questions

## 2024-10-17 NOTE — ED TRIAGE NOTES
Pt has hx of seizures. Pt had three seizures this morning. Ems arrived and pt was postictal. Pt is still drowsy

## 2024-10-17 NOTE — CONSULTS
"Inpatient consult to Neurology  Consult performed by: NEWTON Leary-CNP  Consult ordered by: Jl Hayes PA-C          History Of Present Illness  Aron Colón is a 59 y.o. male with history of seizure presenting  after multiple breakthrough seizures.  Patient is postictal and lethargic from IV Ativan after a witnessed seizure in the emergency department within the last hour prior to assessment.  It is noted that he had 3 seizures prior to arrival.  His brother tells me that he has not been on his seizure medication and he is not sure what he was supposed to be taking or how long he has been off of it.  Patient does not drink, daily marijuana and tobacco use.  Past Medical History  Past Medical History:   Diagnosis Date    Seizures (Multi)      Surgical History  Past Surgical History:   Procedure Laterality Date    SHOULDER SURGERY Left      Social History  Social History     Tobacco Use    Smoking status: Every Day     Types: Cigarettes    Smokeless tobacco: Never   Substance Use Topics    Alcohol use: Never    Drug use: Yes     Types: Marijuana     Allergies  Patient has no known allergies.  (Not in a hospital admission)      Review of Systems   Neurological:  Positive for seizures.     Neurological Exam  Exam is limited as patient is lethargic from administration of Ativan as well as postictal for multiple breakthrough seizures.  He moves all extremities against gravity no notable focal deficit or facial asymmetry.  Physical Exam  Cardiovascular:      Rate and Rhythm: Normal rate.   Pulmonary:      Effort: Pulmonary effort is normal.       Last Recorded Vitals  Blood pressure 113/67, pulse (!) 101, temperature 36.5 °C (97.7 °F), resp. rate 15, height 1.778 m (5' 10\"), weight 69.9 kg (154 lb 1.6 oz), SpO2 96%.    Relevant Results                    Dayana Coma Scale  Best Eye Response: To verbal stimuli  Best Verbal Response: Confused  Best Motor Response: Follows commands  H[place]man Coma " Scale Score: 13                 I have personally reviewed the following imaging results ECG 12 lead    Result Date: 10/17/2024  Atrial fibrillation with rapid ventricular response Abnormal ECG When compared with ECG of 13-DEC-2023 09:39, Atrial fibrillation has replaced Sinus rhythm Vent. rate has increased BY  63 BPM ST now depressed in Anterior leads Confirmed by Amari Douglass ([id number]) on 10/17/2024 11:40:54 AM    CT head wo IV contrast    Result Date: 10/17/2024  Interpreted By:  Armani Barger, STUDY: CT HEAD WO IV CONTRAST;  10/17/2024 10:42 am   INDICATION: Signs/Symptoms:seizure.   COMPARISON: Previous exam from 12/13/2023.   ACCESSION NUMBER(S): [id number]   ORDERING CLINICIAN: NASIR SYED   TECHNIQUE: Routine axial images were obtained from the skull base through the vertex. Sagittal and coronal reconstruction images were generated. Brain, subdural, and bone windows were reviewed. N/A Unavailable   FINDINGS: INTRACRANIAL: The ventricles, sulci and basal cisterns were within normal limits. No acute intracranial bleed, midline shift, or focal mass effect. No destructive bone lesion. No depressed skull fracture. No abnormal skull base arterial calcifications.   EXTRACRANIAL: Visualized paranasal sinuses were clear. Visualized mastoid air cells were clear.       Negative exam.   MACRO: None   Signed by: Armani Barger 10/17/2024 10:55 AM Dictation workstation:   [id number]    XR chest 1 view    Result Date: 10/17/2024  Interpreted By:  Abril Lu, STUDY: XR CHEST 1 VIEW 10/17/2024 10:04 am   INDICATION: Signs/Symptoms:seizure   COMPARISON: 12/13/2023   ACCESSION NUMBER(S): [id number]   ORDERING CLINICIAN: NASIR SYED   TECHNIQUE: AP semi-erect view of the chest at bedside   FINDINGS: The cardiac size is within normal limits. Pleural thickening blunts the left lateral costophrenic angle. There is pleural pericardial adhesion on the left unchanged with mild pulmonary parenchymal fibrosis in the  left mid lung zone. No acute pulmonary pathology is observed.       Pleural thickening blunting left lateral costophrenic angle with pleural pericardial adhesion on the left seen. There is some fibrosis/scarring in the left mid lung zone unchanged.   Signed by: Abril Lu 10/17/2024 10:14 AM Dictation workstation:   [id number]  .      Assessment/Plan   Assessment & Plan  Status epilepticus (Multi)    59 y.o. male with history of seizure presenting  after multiple breakthrough seizures in the setting of noncompliance with medication.  Exam is limited given lethargy from medications and postictal state though seemingly nonfocal. Will re start medication and anticipate sign off.               Racheal Maravilla, APRN-CNP

## 2024-10-18 LAB
ANION GAP SERPL CALCULATED.3IONS-SCNC: 10 MMOL/L (ref 10–20)
BASOPHILS # BLD AUTO: 0.07 X10*3/UL (ref 0–0.1)
BASOPHILS NFR BLD AUTO: 0.5 %
BUN SERPL-MCNC: 11 MG/DL (ref 6–23)
CALCIUM SERPL-MCNC: 8.4 MG/DL (ref 8.6–10.3)
CHLORIDE SERPL-SCNC: 107 MMOL/L (ref 98–107)
CO2 SERPL-SCNC: 25 MMOL/L (ref 21–32)
CREAT SERPL-MCNC: 1.03 MG/DL (ref 0.5–1.3)
EGFRCR SERPLBLD CKD-EPI 2021: 84 ML/MIN/1.73M*2
EOSINOPHIL # BLD AUTO: 0.08 X10*3/UL (ref 0–0.7)
EOSINOPHIL NFR BLD AUTO: 0.6 %
ERYTHROCYTE [DISTWIDTH] IN BLOOD BY AUTOMATED COUNT: 15.5 % (ref 11.5–14.5)
GLUCOSE SERPL-MCNC: 93 MG/DL (ref 74–99)
HCT VFR BLD AUTO: 41.5 % (ref 41–52)
HGB BLD-MCNC: 13.8 G/DL (ref 13.5–17.5)
HOLD SPECIMEN: NORMAL
IMM GRANULOCYTES # BLD AUTO: 0.07 X10*3/UL (ref 0–0.7)
IMM GRANULOCYTES NFR BLD AUTO: 0.5 % (ref 0–0.9)
LYMPHOCYTES # BLD AUTO: 2.86 X10*3/UL (ref 1.2–4.8)
LYMPHOCYTES NFR BLD AUTO: 20 %
MCH RBC QN AUTO: 28.4 PG (ref 26–34)
MCHC RBC AUTO-ENTMCNC: 33.3 G/DL (ref 32–36)
MCV RBC AUTO: 85 FL (ref 80–100)
MONOCYTES # BLD AUTO: 1.02 X10*3/UL (ref 0.1–1)
MONOCYTES NFR BLD AUTO: 7.1 %
NEUTROPHILS # BLD AUTO: 10.23 X10*3/UL (ref 1.2–7.7)
NEUTROPHILS NFR BLD AUTO: 71.3 %
NRBC BLD-RTO: 0 /100 WBCS (ref 0–0)
PLATELET # BLD AUTO: 237 X10*3/UL (ref 150–450)
POTASSIUM SERPL-SCNC: 3.7 MMOL/L (ref 3.5–5.3)
RBC # BLD AUTO: 4.86 X10*6/UL (ref 4.5–5.9)
SODIUM SERPL-SCNC: 138 MMOL/L (ref 136–145)
WBC # BLD AUTO: 14.3 X10*3/UL (ref 4.4–11.3)

## 2024-10-18 PROCEDURE — 85025 COMPLETE CBC W/AUTO DIFF WBC: CPT | Performed by: INTERNAL MEDICINE

## 2024-10-18 PROCEDURE — G0378 HOSPITAL OBSERVATION PER HR: HCPCS

## 2024-10-18 PROCEDURE — 36415 COLL VENOUS BLD VENIPUNCTURE: CPT | Performed by: INTERNAL MEDICINE

## 2024-10-18 PROCEDURE — 2500000001 HC RX 250 WO HCPCS SELF ADMINISTERED DRUGS (ALT 637 FOR MEDICARE OP): Performed by: NURSE PRACTITIONER

## 2024-10-18 PROCEDURE — 80048 BASIC METABOLIC PNL TOTAL CA: CPT | Performed by: INTERNAL MEDICINE

## 2024-10-18 PROCEDURE — 1210000001 HC SEMI-PRIVATE ROOM DAILY

## 2024-10-18 PROCEDURE — 2500000004 HC RX 250 GENERAL PHARMACY W/ HCPCS (ALT 636 FOR OP/ED): Performed by: INTERNAL MEDICINE

## 2024-10-18 RX ORDER — LEVETIRACETAM 500 MG/1
500 TABLET ORAL 2 TIMES DAILY
Status: DISCONTINUED | OUTPATIENT
Start: 2024-10-18 | End: 2024-10-19

## 2024-10-18 RX ADMIN — LEVETIRACETAM 1000 MG: 10 INJECTION INTRAVENOUS at 10:27

## 2024-10-18 RX ADMIN — LEVETIRACETAM 500 MG: 500 TABLET, FILM COATED ORAL at 20:41

## 2024-10-18 SDOH — HEALTH STABILITY: PHYSICAL HEALTH: ON AVERAGE, HOW MANY MINUTES DO YOU ENGAGE IN EXERCISE AT THIS LEVEL?: 0 MIN

## 2024-10-18 SDOH — SOCIAL STABILITY: SOCIAL NETWORK: HOW OFTEN DO YOU GET TOGETHER WITH FRIENDS OR RELATIVES?: ONCE A WEEK

## 2024-10-18 SDOH — SOCIAL STABILITY: SOCIAL INSECURITY: ARE YOU MARRIED, WIDOWED, DIVORCED, SEPARATED, NEVER MARRIED, OR LIVING WITH A PARTNER?: DIVORCED

## 2024-10-18 SDOH — SOCIAL STABILITY: SOCIAL NETWORK: IN A TYPICAL WEEK, HOW MANY TIMES DO YOU TALK ON THE PHONE WITH FAMILY, FRIENDS, OR NEIGHBORS?: ONCE A WEEK

## 2024-10-18 SDOH — HEALTH STABILITY: MENTAL HEALTH
DO YOU FEEL STRESS - TENSE, RESTLESS, NERVOUS, OR ANXIOUS, OR UNABLE TO SLEEP AT NIGHT BECAUSE YOUR MIND IS TROUBLED ALL THE TIME - THESE DAYS?: TO SOME EXTENT

## 2024-10-18 SDOH — HEALTH STABILITY: MENTAL HEALTH: HOW OFTEN DO YOU HAVE SIX OR MORE DRINKS ON ONE OCCASION?: NEVER

## 2024-10-18 SDOH — SOCIAL STABILITY: SOCIAL NETWORK: HOW OFTEN DO YOU ATTEND CHURCH OR RELIGIOUS SERVICES?: NEVER

## 2024-10-18 SDOH — ECONOMIC STABILITY: HOUSING INSECURITY: IN THE PAST 12 MONTHS, HOW MANY TIMES HAVE YOU MOVED WHERE YOU WERE LIVING?: 1

## 2024-10-18 SDOH — SOCIAL STABILITY: SOCIAL NETWORK
DO YOU BELONG TO ANY CLUBS OR ORGANIZATIONS SUCH AS CHURCH GROUPS, UNIONS, FRATERNAL OR ATHLETIC GROUPS, OR SCHOOL GROUPS?: NO

## 2024-10-18 SDOH — ECONOMIC STABILITY: HOUSING INSECURITY: AT ANY TIME IN THE PAST 12 MONTHS, WERE YOU HOMELESS OR LIVING IN A SHELTER (INCLUDING NOW)?: NO

## 2024-10-18 SDOH — ECONOMIC STABILITY: FOOD INSECURITY: HOW HARD IS IT FOR YOU TO PAY FOR THE VERY BASICS LIKE FOOD, HOUSING, MEDICAL CARE, AND HEATING?: NOT VERY HARD

## 2024-10-18 SDOH — ECONOMIC STABILITY: HOUSING INSECURITY: IN THE LAST 12 MONTHS, WAS THERE A TIME WHEN YOU WERE NOT ABLE TO PAY THE MORTGAGE OR RENT ON TIME?: NO

## 2024-10-18 SDOH — ECONOMIC STABILITY: FOOD INSECURITY: WITHIN THE PAST 12 MONTHS, YOU WORRIED THAT YOUR FOOD WOULD RUN OUT BEFORE YOU GOT THE MONEY TO BUY MORE.: NEVER TRUE

## 2024-10-18 SDOH — SOCIAL STABILITY: SOCIAL INSECURITY: WITHIN THE LAST YEAR, HAVE YOU BEEN AFRAID OF YOUR PARTNER OR EX-PARTNER?: NO

## 2024-10-18 SDOH — ECONOMIC STABILITY: FOOD INSECURITY: WITHIN THE PAST 12 MONTHS, THE FOOD YOU BOUGHT JUST DIDN'T LAST AND YOU DIDN'T HAVE MONEY TO GET MORE.: NEVER TRUE

## 2024-10-18 SDOH — ECONOMIC STABILITY: INCOME INSECURITY: IN THE PAST 12 MONTHS HAS THE ELECTRIC, GAS, OIL, OR WATER COMPANY THREATENED TO SHUT OFF SERVICES IN YOUR HOME?: NO

## 2024-10-18 SDOH — HEALTH STABILITY: PHYSICAL HEALTH: ON AVERAGE, HOW MANY DAYS PER WEEK DO YOU ENGAGE IN MODERATE TO STRENUOUS EXERCISE (LIKE A BRISK WALK)?: 0 DAYS

## 2024-10-18 SDOH — SOCIAL STABILITY: SOCIAL NETWORK: HOW OFTEN DO YOU ATTEND MEETINGS OF THE CLUBS OR ORGANIZATIONS YOU BELONG TO?: NEVER

## 2024-10-18 SDOH — HEALTH STABILITY: PHYSICAL HEALTH
HOW OFTEN DO YOU NEED TO HAVE SOMEONE HELP YOU WHEN YOU READ INSTRUCTIONS, PAMPHLETS, OR OTHER WRITTEN MATERIAL FROM YOUR DOCTOR OR PHARMACY?: RARELY

## 2024-10-18 SDOH — HEALTH STABILITY: MENTAL HEALTH: HOW OFTEN DO YOU HAVE A DRINK CONTAINING ALCOHOL?: NEVER

## 2024-10-18 SDOH — HEALTH STABILITY: MENTAL HEALTH: HOW MANY DRINKS CONTAINING ALCOHOL DO YOU HAVE ON A TYPICAL DAY WHEN YOU ARE DRINKING?: PATIENT DOES NOT DRINK

## 2024-10-18 SDOH — SOCIAL STABILITY: SOCIAL INSECURITY: WITHIN THE LAST YEAR, HAVE YOU BEEN HUMILIATED OR EMOTIONALLY ABUSED IN OTHER WAYS BY YOUR PARTNER OR EX-PARTNER?: NO

## 2024-10-18 SDOH — ECONOMIC STABILITY: TRANSPORTATION INSECURITY: IN THE PAST 12 MONTHS, HAS LACK OF TRANSPORTATION KEPT YOU FROM MEDICAL APPOINTMENTS OR FROM GETTING MEDICATIONS?: NO

## 2024-10-18 ASSESSMENT — PAIN SCALES - GENERAL
PAINLEVEL_OUTOF10: 0 - NO PAIN

## 2024-10-18 ASSESSMENT — LIFESTYLE VARIABLES
AUDIT-C TOTAL SCORE: 0
SKIP TO QUESTIONS 9-10: 1

## 2024-10-18 ASSESSMENT — ENCOUNTER SYMPTOMS
COUGH: 0
MYALGIAS: 1
CARDIOVASCULAR NEGATIVE: 1
NAUSEA: 0
CONSTITUTIONAL NEGATIVE: 1
FEVER: 0
RESPIRATORY NEGATIVE: 1
SEIZURES: 1
SHORTNESS OF BREATH: 0
EYES NEGATIVE: 1
GASTROINTESTINAL NEGATIVE: 1
DIARRHEA: 0
VOMITING: 0
CHILLS: 0

## 2024-10-18 ASSESSMENT — PAIN - FUNCTIONAL ASSESSMENT
PAIN_FUNCTIONAL_ASSESSMENT: 0-10
PAIN_FUNCTIONAL_ASSESSMENT: 0-10

## 2024-10-18 ASSESSMENT — ACTIVITIES OF DAILY LIVING (ADL)
LACK_OF_TRANSPORTATION: NO
LACK_OF_TRANSPORTATION: NO

## 2024-10-18 ASSESSMENT — PAIN SCALES - WONG BAKER: WONGBAKER_NUMERICALRESPONSE: NO HURT

## 2024-10-18 NOTE — PROGRESS NOTES
TCC met with patient. Assessment complete. Patient lives with his brother in an apartment. Patient is typically independent. Patient does not drive, his brother transports him. Patient able to assist with shopping, cooking and cleaning. Patient smokes 1/2PPD and smokes marijuana occasionally. Patient does not have a PCP. Patient fills prescriptions at [facility] in [place]. At this time the discharge plan is for patient to return home. Patient declined the need for rehab and HHC. Will follow.      10/18/24 1141   Discharge Planning   Living Arrangements Family members   Support Systems Family members   Type of Residence Private residence   Home or Post Acute Services None   Expected Discharge Disposition Home   Does the patient need discharge transport arranged? No   Financial Resource Strain   How hard is it for you to pay for the very basics like food, housing, medical care, and heating? Not very   Housing Stability   In the last 12 months, was there a time when you were not able to pay the mortgage or rent on time? N   In the past 12 months, how many times have you moved where you were living? 1   At any time in the past 12 months, were you homeless or living in a shelter (including now)? N   Transportation Needs   In the past 12 months, has lack of transportation kept you from medical appointments or from getting medications? no   In the past 12 months, has lack of transportation kept you from meetings, work, or from getting things needed for daily living? No

## 2024-10-18 NOTE — PROGRESS NOTES
10/18/24 1139   Physical Activity   On average, how many days per week do you engage in moderate to strenuous exercise (like a brisk walk)? 0 days   On average, how many minutes do you engage in exercise at this level? 0 min   Financial Resource Strain   How hard is it for you to pay for the very basics like food, housing, medical care, and heating? Not very   Housing Stability   In the last 12 months, was there a time when you were not able to pay the mortgage or rent on time? N   In the past 12 months, how many times have you moved where you were living? 1   At any time in the past 12 months, were you homeless or living in a shelter (including now)? N   Transportation Needs   In the past 12 months, has lack of transportation kept you from medical appointments or from getting medications? no   In the past 12 months, has lack of transportation kept you from meetings, work, or from getting things needed for daily living? No   Food Insecurity   Within the past 12 months, you worried that your food would run out before you got the money to buy more. Never true   Within the past 12 months, the food you bought just didn't last and you didn't have money to get more. Never true   Stress   Do you feel stress - tense, restless, nervous, or anxious, or unable to sleep at night because your mind is troubled all the time - these days? To some exte   Social Connections   In a typical week, how many times do you talk on the phone with family, friends, or neighbors? Once a week   How often do you get together with friends or relatives? Once   How often do you attend Temple or Denominational services? Never   Do you belong to any clubs or organizations such as Temple groups, unions, fraternal or athletic groups, or school groups? No   How often do you attend meetings of the clubs or organizations you belong to? Never   Are you , , , , never , or living with a partner?    Intimate Partner  Violence   Within the last year, have you been afraid of your partner or ex-partner? No   Within the last year, have you been humiliated or emotionally abused in other ways by your partner or ex-partner? No   Within the last year, have you been kicked, hit, slapped, or otherwise physically hurt by your partner or ex-partner? No   Within the last year, have you been raped or forced to have any kind of sexual activity by your partner or ex-partner? No   Alcohol Use   Q1: How often do you have a drink containing alcohol? Never   Q2: How many drinks containing alcohol do you have on a typical day when you are drinking? None   Q3: How often do you have six or more drinks on one occasion? Never   Utilities   In the past 12 months has the electric, gas, oil, or water company threatened to shut off services in your home? No   Health Literacy   How often do you need to have someone help you when you read instructions, pamphlets, or other written material from your doctor or pharmacy? Rarely

## 2024-10-18 NOTE — H&P
INTERNAL MEDICINE     HISTORY AND PHYSICAL      Chief Complaint:  Breakthrough seizures    History Of Present Illness  Aron Colón is a 59 y.o. male presenting with several seizures yesterday.  Patient has not been on antiepileptics for several months.  He has no primary care physician and states that he gets his prescriptions from the emergency room when he needs it.  He admits to not actively seeking care.  He had several seizures yesterday including a witnessed 1 in the ER.  He was started on Keppra and currently is awake and alert.  He denies headaches or focal weakness.  He has generalized aches and pains related to the seizures.  He was admitted for further treatment.     Past Medical History  He has a past medical history of Seizures (Multi).    Surgical History  He has a past surgical history that includes Shoulder surgery (Left).     Social History  He reports that he has been smoking cigarettes. He has never used smokeless tobacco. He reports current drug use. Drug: Marijuana. He reports that he does not drink alcohol.    Family History  No family history on file.     Allergies  Patient has no known allergies.    Home Medications:  Prior to Admission medications    Medication Sig Start Date End Date Taking? Authorizing Provider   albuterol (Ventolin HFA) 90 mcg/actuation inhaler Inhale 2 puffs every 4 hours if needed for wheezing or shortness of breath. 12/15/23   JERRELL Corona   levETIRAcetam (Keppra) 500 mg tablet Take 1 tablet (500 mg) by mouth 2 times a day. 12/15/23   JERRELL Corona   nicotine (Nicoderm CQ) 14 mg/24 hr patch Place 1 patch over 24 hours on the skin once every 24 hours.  Patient not taking: Reported on 10/17/2024 12/15/23   JERRELL Corona        Review of Systems   Constitutional: Negative.    HENT: Negative.     Eyes: Negative.    Respiratory: Negative.     Cardiovascular: Negative.    Gastrointestinal: Negative.    Musculoskeletal:   "Positive for myalgias.   Skin: Negative.    Neurological:  Positive for seizures.       Last Recorded Vitals  Blood pressure 132/64, pulse 77, temperature 36.6 °C (97.9 °F), temperature source Oral, resp. rate 17, height 1.778 m (5' 10\"), weight 69.9 kg (154 lb 1.6 oz), SpO2 97%.    Physical Exam      Constitutional:       Appearance: Normal appearance.   HENT:      Head: Normocephalic and atraumatic.   Eyes:      Extraocular Movements: Extraocular movements intact.      Pupils: Pupils are equal, round, and reactive to light.   Cardiovascular:      Rate and Rhythm: Normal rate and regular rhythm.      Pulses: Normal pulses.      Heart sounds: Normal heart sounds.   Pulmonary:      Effort: Pulmonary effort is normal.      Breath sounds: Normal breath sounds.   Abdominal:      General: Abdomen is flat. Bowel sounds are normal.      Palpations: Abdomen is soft.   Musculoskeletal:         General: Normal range of motion.      Cervical back: Normal range of motion and neck supple.   Skin:     General: Skin is warm and dry.   Neurological:      General: No focal deficit present.      Mental Status: He is alert and oriented to person, place, and time. Mental status is at baseline.         Relevant Results    Results for orders placed or performed during the hospital encounter of 10/17/24 (from the past 24 hours)   Lactate   Result Value Ref Range    Lactate 2.9 (H) 0.4 - 2.0 mmol/L   Lactate   Result Value Ref Range    Lactate 1.6 0.4 - 2.0 mmol/L   Urinalysis with Reflex Culture and Microscopic   Result Value Ref Range    Color, Urine Light-Yellow Light-Yellow, Yellow, Dark-Yellow    Appearance, Urine Clear Clear    Specific Gravity, Urine 1.019 1.005 - 1.035    pH, Urine 5.5 5.0, 5.5, 6.0, 6.5, 7.0, 7.5, 8.0    Protein, Urine NEGATIVE NEGATIVE, 10 (TRACE), 20 (TRACE) mg/dL    Glucose, Urine Normal Normal mg/dL    Blood, Urine 0.06 (1+) (A) NEGATIVE    Ketones, Urine TRACE (A) NEGATIVE mg/dL    Bilirubin, Urine NEGATIVE " NEGATIVE    Urobilinogen, Urine Normal Normal mg/dL    Nitrite, Urine NEGATIVE NEGATIVE    Leukocyte Esterase, Urine NEGATIVE NEGATIVE   Extra Urine Gray Tube   Result Value Ref Range    Extra Tube Hold for add-ons.    Urinalysis Microscopic   Result Value Ref Range    WBC, Urine NONE 1-5, NONE /HPF    RBC, Urine 1-2 NONE, 1-2, 3-5 /HPF   CBC and Auto Differential   Result Value Ref Range    WBC 14.3 (H) 4.4 - 11.3 x10*3/uL    nRBC 0.0 0.0 - 0.0 /100 WBCs    RBC 4.86 4.50 - 5.90 x10*6/uL    Hemoglobin 13.8 13.5 - 17.5 g/dL    Hematocrit 41.5 41.0 - 52.0 %    MCV 85 80 - 100 fL    MCH 28.4 26.0 - 34.0 pg    MCHC 33.3 32.0 - 36.0 g/dL    RDW 15.5 (H) 11.5 - 14.5 %    Platelets 237 150 - 450 x10*3/uL    Neutrophils % 71.3 40.0 - 80.0 %    Immature Granulocytes %, Automated 0.5 0.0 - 0.9 %    Lymphocytes % 20.0 13.0 - 44.0 %    Monocytes % 7.1 2.0 - 10.0 %    Eosinophils % 0.6 0.0 - 6.0 %    Basophils % 0.5 0.0 - 2.0 %    Neutrophils Absolute 10.23 (H) 1.20 - 7.70 x10*3/uL    Immature Granulocytes Absolute, Automated 0.07 0.00 - 0.70 x10*3/uL    Lymphocytes Absolute 2.86 1.20 - 4.80 x10*3/uL    Monocytes Absolute 1.02 (H) 0.10 - 1.00 x10*3/uL    Eosinophils Absolute 0.08 0.00 - 0.70 x10*3/uL    Basophils Absolute 0.07 0.00 - 0.10 x10*3/uL   Basic Metabolic Panel   Result Value Ref Range    Glucose 93 74 - 99 mg/dL    Sodium 138 136 - 145 mmol/L    Potassium 3.7 3.5 - 5.3 mmol/L    Chloride 107 98 - 107 mmol/L    Bicarbonate 25 21 - 32 mmol/L    Anion Gap 10 10 - 20 mmol/L    Urea Nitrogen 11 6 - 23 mg/dL    Creatinine 1.03 0.50 - 1.30 mg/dL    eGFR 84 >60 mL/min/1.73m*2    Calcium 8.4 (L) 8.6 - 10.3 mg/dL         Assessment & Plan  Status epilepticus (Multi)        Problem list:  Principal Problem:    Status epilepticus (Multi)        ASSESSMENT AND PLAN:    Breakthrough seizures -secondary to noncompliance, restarted on Keppra.  Noncompliance -strongly encouraged to be compliant with medications.  Risk of morbidity and  mortality explained.  Leukocytosis -likely secondary to the above, no fever.  Lactic acidosis -secondary to seizures.    Discharge planning.      Armani Pearson MD  10/18/2411:01 AM

## 2024-10-18 NOTE — PROGRESS NOTES
"Aron Colón is a 59 y.o. male on day 1 of admission presenting with Status epilepticus (Multi).      Subjective   Patient is much more awake today opens eyes spontaneously and answers questions appropriately.  When asked why he stopped his seizure medication he was uncertain stating he just stopped it.       Objective     Last Recorded Vitals  Blood pressure 132/64, pulse 77, temperature 36.6 °C (97.9 °F), temperature source Oral, resp. rate 17, height 1.778 m (5' 10\"), weight 69.9 kg (154 lb 1.6 oz), SpO2 97%.    Physical Exam  Cardiovascular:      Rate and Rhythm: Normal rate.   Pulmonary:      Effort: Pulmonary effort is normal.       Neurological Exam  Neurologic exam:    Awake alert oriented to all, no dysarthria, no aphasia  Naming repetition intact, speech is fluent  PERRL, EOMI without ptosis or nystagmus, visual fields intact, face symmetrical, tongue midline  Strength in upper and lower extremities is 5/5  Sensation is intact  FTN intact WARD intact  Heel-to-shin without ataxia  Reflexes  2 throughout,  Toes downgoing bilaterally  Gait not assessed at this time    Relevant Results                    Hudson Coma Scale  Best Eye Response: Spontaneous  Best Verbal Response: Confused  Best Motor Response: Follows commands  Hudson Coma Scale Score: 14                             Assessment/Plan      Assessment & Plan  Status epilepticus (Multi)  59 y.o. male with history of seizure presenting  after multiple breakthrough seizures in the setting of noncompliance with medication.  Exam is unremarkable.  He is back to baseline today.  Discussed with patient at length the importance of taking medications as prescribed and the risks of having breakthrough seizures and the risk of death and seizures.  Continue Keppra at 500 mg twice daily at discharge.  Anticipate we will sign off and he can follow-up in 2 weeks.                 Racheal Maravilla, APRN-CNP  "

## 2024-10-18 NOTE — CARE PLAN
Problem: Pain - Adult  Goal: Verbalizes/displays adequate comfort level or baseline comfort level  Outcome: Progressing     Problem: Safety - Adult  Goal: Free from fall injury  Outcome: Progressing     Problem: Discharge Planning  Goal: Discharge to home or other facility with appropriate resources  Outcome: Progressing     Problem: Chronic Conditions and Co-morbidities  Goal: Patient's chronic conditions and co-morbidity symptoms are monitored and maintained or improved  Outcome: Progressing     Problem: Fall/Injury  Goal: Not fall by end of shift  Outcome: Progressing  Goal: Be free from injury by end of the shift  Outcome: Progressing  Goal: Verbalize understanding of personal risk factors for fall in the hospital  Outcome: Progressing  Goal: Verbalize understanding of risk factor reduction measures to prevent injury from fall in the home  Outcome: Progressing  Goal: Use assistive devices by end of the shift  Outcome: Progressing  Goal: Pace activities to prevent fatigue by end of the shift  Outcome: Progressing   The patient's goals for the shift include      The clinical goals for the shift include maintain seizure precautions

## 2024-10-18 NOTE — CONSULTS
Inpatient consult to Infectious Diseases  Consult performed by: Sj Pickett MD  Consult ordered by: Elodia Whitman, NEWTON-CNP, DNP            Primary MD: No Assigned PCP Generic Provider, MD    Reason For Consult  Leukocytosis    History Of Present Illness  Aron Colón is a 59 y.o. male presenting with breakthrough seizures.  He is reported to have had 3 seizures prior to arrival to the hospital.  I have been asked to see him because of elevated white blood cell count.  He was lucid at time of evaluation.  He could tell me when he was in his favorite food.  He denies any fever or chills.  He denies any chest pain or shortness of breath.  He denies any nausea vomiting or diarrhea  He is not on any antibiotics     Past Medical History  He has a past medical history of Seizures (Multi).    Surgical History  He has a past surgical history that includes Shoulder surgery (Left).     Social History     Occupational History    Not on file   Tobacco Use    Smoking status: Every Day     Types: Cigarettes    Smokeless tobacco: Never   Substance and Sexual Activity    Alcohol use: Never    Drug use: Yes     Types: Marijuana    Sexual activity: Not on file     Travel History   Travel since 09/18/24    No documented travel since 09/18/24         Family History  No family history on file.  Allergies  Patient has no known allergies.     There is no immunization history for the selected administration types on file for this patient.  Medications  Home medications:  Medications Prior to Admission   Medication Sig Dispense Refill Last Dose/Taking    albuterol (Ventolin HFA) 90 mcg/actuation inhaler Inhale 2 puffs every 4 hours if needed for wheezing or shortness of breath. 8.5 g 1 Unknown    levETIRAcetam (Keppra) 500 mg tablet Take 1 tablet (500 mg) by mouth 2 times a day. 60 tablet 2 Unknown    nicotine (Nicoderm CQ) 14 mg/24 hr patch Place 1 patch over 24 hours on the skin once every 24 hours. (Patient not taking:  Reported on 10/17/2024) 28 patch 0 Not Taking     Current medications:  Scheduled medications  levETIRAcetam, 500 mg, oral, BID      Continuous medications     PRN medications  PRN medications: acetaminophen, ondansetron    Review of Systems   Constitutional:  Negative for chills and fever.   Respiratory:  Negative for cough and shortness of breath.    Gastrointestinal:  Negative for diarrhea, nausea and vomiting.   Neurological:  Positive for seizures.   All other systems reviewed and are negative.       Objective  Range of Vitals (last 24 hours)  Heart Rate:  [76-79]   Temp:  [36.6 °C (97.9 °F)-36.8 °C (98.2 °F)]   Resp:  [16-18]   BP: (132-147)/(64-75)   SpO2:  [96 %-97 %]   Daily Weight  10/17/24 : 69.9 kg (154 lb 1.6 oz)    Body mass index is 22.11 kg/m².     Physical Exam  Constitutional:       Appearance: Normal appearance.   HENT:      Head: Normocephalic and atraumatic.      Nose: Nose normal.   Eyes:      General: No scleral icterus.     Extraocular Movements: Extraocular movements intact.      Conjunctiva/sclera: Conjunctivae normal.   Cardiovascular:      Rate and Rhythm: Normal rate and regular rhythm.      Pulses: Normal pulses.      Heart sounds: Normal heart sounds.   Pulmonary:      Breath sounds: Normal breath sounds.   Abdominal:      General: Bowel sounds are normal.      Palpations: Abdomen is soft.   Musculoskeletal:      Cervical back: Normal range of motion and neck supple.      Right lower leg: No edema.      Left lower leg: No edema.   Skin:     General: Skin is warm and dry.   Neurological:      Mental Status: He is alert.   Psychiatric:         Behavior: Behavior normal.          Relevant Results  Outside Hospital Results    Labs  Results from last 72 hours   Lab Units 10/18/24  0458 10/17/24  0938   WBC AUTO x10*3/uL 14.3* 19.2*   HEMOGLOBIN g/dL 13.8 15.6   HEMATOCRIT % 41.5 48.9   PLATELETS AUTO x10*3/uL 237 308   NEUTROS PCT AUTO % 71.3 80.3   LYMPHS PCT AUTO % 20.0 13.4   MONOS PCT  "AUTO % 7.1 4.5   EOS PCT AUTO % 0.6 0.8     Results from last 72 hours   Lab Units 10/18/24  0458 10/17/24  0938   SODIUM mmol/L 138 137   POTASSIUM mmol/L 3.7 4.1   CHLORIDE mmol/L 107 100   CO2 mmol/L 25 20*   BUN mg/dL 11 12   CREATININE mg/dL 1.03 1.28   GLUCOSE mg/dL 93 141*   CALCIUM mg/dL 8.4* 9.3   ANION GAP mmol/L 10 21*   EGFR mL/min/1.73m*2 84 64     Results from last 72 hours   Lab Units 10/17/24  0938   ALK PHOS U/L 101   BILIRUBIN TOTAL mg/dL 0.4   PROTEIN TOTAL g/dL 7.2   ALT U/L 14   AST U/L 17   ALBUMIN g/dL 4.2     Estimated Creatinine Clearance: 76.3 mL/min (by C-G formula based on SCr of 1.03 mg/dL).  No results found for: \"CRP\", \"SEDRATE\"  No results found for: \"HIV1X2\", \"HIVCONF\", \"ZXMTFM1VH\"  No results found for: \"HEPCABINIT\", \"HEPCAB\", \"HCVPCRQUANT\"  Microbiology  Reviewed  Imaging  ECG 12 lead    Result Date: 10/17/2024  Atrial fibrillation with rapid ventricular response Abnormal ECG When compared with ECG of 13-DEC-2023 09:39, Atrial fibrillation has replaced Sinus rhythm Vent. rate has increased BY  63 BPM ST now depressed in Anterior leads Confirmed by Amari Douglass ([id number]) on 10/17/2024 11:40:54 AM    CT head wo IV contrast    Result Date: 10/17/2024  Interpreted By:  Armani Barger, STUDY: CT HEAD WO IV CONTRAST;  10/17/2024 10:42 am   INDICATION: Signs/Symptoms:seizure.   COMPARISON: Previous exam from 12/13/2023.   ACCESSION NUMBER(S): [id number]   ORDERING CLINICIAN: NASIR SYED   TECHNIQUE: Routine axial images were obtained from the skull base through the vertex. Sagittal and coronal reconstruction images were generated. Brain, subdural, and bone windows were reviewed. N/A Unavailable   FINDINGS: INTRACRANIAL: The ventricles, sulci and basal cisterns were within normal limits. No acute intracranial bleed, midline shift, or focal mass effect. No destructive bone lesion. No depressed skull fracture. No abnormal skull base arterial calcifications.   EXTRACRANIAL: Visualized " paranasal sinuses were clear. Visualized mastoid air cells were clear.       Negative exam.   MACRO: None   Signed by: Armani Barger 10/17/2024 10:55 AM Dictation workstation:   [id number]    XR chest 1 view    Result Date: 10/17/2024  Interpreted By:  Abril Lu, STUDY: XR CHEST 1 VIEW 10/17/2024 10:04 am   INDICATION: Signs/Symptoms:seizure   COMPARISON: 12/13/2023   ACCESSION NUMBER(S): [id number]   ORDERING CLINICIAN: NASIR SYED   TECHNIQUE: AP semi-erect view of the chest at bedside   FINDINGS: The cardiac size is within normal limits. Pleural thickening blunts the left lateral costophrenic angle. 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 No acute pulmonary pathology is observed.       Pleural thickening blunting left lateral costophrenic angle with pleural pericardial adhesion on the left seen. There is some fibrosis/scarring in the left mid lung zone unchanged.   Signed by: Abril Lu 10/17/2024 10:14 AM Dictation workstation:   [id number]     Assessment/Plan   Leukocytosis-most likely secondary to stress  Status epilepticus, neurology on consult    No antibiotics  Follow-up blood cultures  Monitor temperature and WBC      Sj Pickett MD

## 2024-10-18 NOTE — NURSING NOTE
Patient awake mildly confused. Asking where he was at and why. Patient was easily reoriented. Ambulated to restroom well, New gown and pajama bottoms  given to patient. Pt now back in bed resting.

## 2024-10-18 NOTE — ASSESSMENT & PLAN NOTE
59 y.o. male with history of seizure presenting  after multiple breakthrough seizures in the setting of noncompliance with medication.  Exam is unremarkable.  He is back to baseline today.  Discussed with patient at length the importance of taking medications as prescribed and the risks of having breakthrough seizures and the risk of death and seizures.  Continue Keppra at 500 mg twice daily at discharge.  Anticipate we will sign off and he can follow-up in 2 weeks.

## 2024-10-18 NOTE — NURSING NOTE
Assumed care of patient. Bedside report received, patient in bed resting ; eyes closed no s/s of distress. Seizure precautions in place.

## 2024-10-19 VITALS
DIASTOLIC BLOOD PRESSURE: 78 MMHG | OXYGEN SATURATION: 96 % | RESPIRATION RATE: 18 BRPM | HEART RATE: 80 BPM | HEIGHT: 70 IN | WEIGHT: 154.1 LBS | SYSTOLIC BLOOD PRESSURE: 134 MMHG | TEMPERATURE: 97.7 F | BODY MASS INDEX: 22.06 KG/M2

## 2024-10-19 LAB
BASOPHILS # BLD AUTO: 0.06 X10*3/UL (ref 0–0.1)
BASOPHILS NFR BLD AUTO: 0.6 %
EOSINOPHIL # BLD AUTO: 0.12 X10*3/UL (ref 0–0.7)
EOSINOPHIL NFR BLD AUTO: 1.1 %
ERYTHROCYTE [DISTWIDTH] IN BLOOD BY AUTOMATED COUNT: 15.4 % (ref 11.5–14.5)
HCT VFR BLD AUTO: 42.2 % (ref 41–52)
HGB BLD-MCNC: 14.1 G/DL (ref 13.5–17.5)
HOLD SPECIMEN: NORMAL
IMM GRANULOCYTES # BLD AUTO: 0.03 X10*3/UL (ref 0–0.7)
IMM GRANULOCYTES NFR BLD AUTO: 0.3 % (ref 0–0.9)
LYMPHOCYTES # BLD AUTO: 2.62 X10*3/UL (ref 1.2–4.8)
LYMPHOCYTES NFR BLD AUTO: 25.1 %
MCH RBC QN AUTO: 28.3 PG (ref 26–34)
MCHC RBC AUTO-ENTMCNC: 33.4 G/DL (ref 32–36)
MCV RBC AUTO: 85 FL (ref 80–100)
MONOCYTES # BLD AUTO: 0.71 X10*3/UL (ref 0.1–1)
MONOCYTES NFR BLD AUTO: 6.8 %
NEUTROPHILS # BLD AUTO: 6.91 X10*3/UL (ref 1.2–7.7)
NEUTROPHILS NFR BLD AUTO: 66.1 %
NRBC BLD-RTO: 0 /100 WBCS (ref 0–0)
PLATELET # BLD AUTO: 219 X10*3/UL (ref 150–450)
RBC # BLD AUTO: 4.99 X10*6/UL (ref 4.5–5.9)
WBC # BLD AUTO: 10.5 X10*3/UL (ref 4.4–11.3)

## 2024-10-19 PROCEDURE — G0378 HOSPITAL OBSERVATION PER HR: HCPCS

## 2024-10-19 PROCEDURE — 2500000001 HC RX 250 WO HCPCS SELF ADMINISTERED DRUGS (ALT 637 FOR MEDICARE OP): Performed by: PSYCHIATRY & NEUROLOGY

## 2024-10-19 PROCEDURE — 2500000001 HC RX 250 WO HCPCS SELF ADMINISTERED DRUGS (ALT 637 FOR MEDICARE OP): Performed by: NURSE PRACTITIONER

## 2024-10-19 PROCEDURE — 85025 COMPLETE CBC W/AUTO DIFF WBC: CPT | Performed by: INTERNAL MEDICINE

## 2024-10-19 PROCEDURE — 36415 COLL VENOUS BLD VENIPUNCTURE: CPT | Performed by: INTERNAL MEDICINE

## 2024-10-19 RX ORDER — LAMOTRIGINE 25 MG/1
25 TABLET ORAL 2 TIMES DAILY
Status: DISCONTINUED | OUTPATIENT
Start: 2024-11-02 | End: 2024-10-19 | Stop reason: HOSPADM

## 2024-10-19 RX ORDER — LAMOTRIGINE 100 MG/1
100 TABLET ORAL 2 TIMES DAILY
Status: DISCONTINUED | OUTPATIENT
Start: 2024-11-30 | End: 2024-10-19 | Stop reason: HOSPADM

## 2024-10-19 RX ORDER — LAMOTRIGINE 25 MG/1
75 TABLET ORAL 2 TIMES DAILY
Status: DISCONTINUED | OUTPATIENT
Start: 2024-11-23 | End: 2024-10-19 | Stop reason: HOSPADM

## 2024-10-19 RX ORDER — LAMOTRIGINE 25 MG/1
TABLET ORAL
Qty: 352 TABLET | Refills: 0 | Status: SHIPPED | OUTPATIENT
Start: 2024-10-19 | End: 2024-12-30

## 2024-10-19 RX ORDER — LAMOTRIGINE 25 MG/1
25 TABLET ORAL DAILY
Status: DISCONTINUED | OUTPATIENT
Start: 2024-10-19 | End: 2024-10-19 | Stop reason: HOSPADM

## 2024-10-19 RX ORDER — LAMOTRIGINE 25 MG/1
50 TABLET ORAL 2 TIMES DAILY
Status: DISCONTINUED | OUTPATIENT
Start: 2024-11-16 | End: 2024-10-19 | Stop reason: HOSPADM

## 2024-10-19 RX ADMIN — LAMOTRIGINE 25 MG: 25 TABLET ORAL at 15:06

## 2024-10-19 RX ADMIN — LEVETIRACETAM 500 MG: 500 TABLET, FILM COATED ORAL at 09:01

## 2024-10-19 ASSESSMENT — COGNITIVE AND FUNCTIONAL STATUS - GENERAL
DAILY ACTIVITIY SCORE: 24
MOBILITY SCORE: 24

## 2024-10-19 ASSESSMENT — PAIN SCALES - GENERAL: PAINLEVEL_OUTOF10: 0 - NO PAIN

## 2024-10-19 NOTE — NURSING NOTE
Pt discharged home with brother, discharge instructions reviewed, medication changed and prescriptions reviewed, all questions answered.  Pt walked to elevator and discharged in stable condition with brother

## 2024-10-19 NOTE — PROGRESS NOTES
INTERNAL MEDICINE PROGRESS NOTE      HPI:    Patient states that in the past Keppra caused some anger issues.  He feels well overall.    Vital signs in last 24 hours:  Temp:  [36.5 °C (97.7 °F)-37.1 °C (98.8 °F)] 36.5 °C (97.7 °F)  Heart Rate:  [64-80] 80  Resp:  [18] 18  BP: (134-147)/(65-78) 134/78    Physical Examination:  Physical Exam       Constitutional:       Appearance: Middle-aged, well-built, in no distress  HENT:      Head: Normocephalic and atraumatic.   Eyes:      Extraocular Movements: Extraocular movements intact.      Pupils: Pupils are equal, round, and reactive to light.   Cardiovascular:      Rate and Rhythm: Normal rate and regular rhythm.      Pulses: Normal pulses.      Heart sounds: Normal heart sounds.   Pulmonary:      Effort: Pulmonary effort is normal.      Breath sounds: Normal breath sounds.   Abdominal:      General: Abdomen is flat. Bowel sounds are normal.      Palpations: Abdomen is soft.   Musculoskeletal:         General: Normal range of motion.      Cervical back: Normal range of motion and neck supple.   Skin:     General: Skin is warm and dry.   Neurological:      General: No focal deficit present.      Mental Status: He is alert and oriented to person, place, and time. Mental status is at baseline.      Medications:    Current Facility-Administered Medications:     acetaminophen (Tylenol) tablet 650 mg, 650 mg, oral, q6h PRN, Elodia Whitman, APRN-CNP, DNP    brivaracetam (Briviact) tablet 50 mg, 50 mg, oral, q12h [facility], Anna Chavez MD    lamoTRIgine (LaMICtal) tablet 25 mg, 25 mg, oral, Daily **FOLLOWED BY** [START ON 11/2/2024] lamoTRIgine (LaMICtal) tablet 25 mg, 25 mg, oral, BID **FOLLOWED BY** [START ON 11/16/2024] lamoTRIgine (LaMICtal) tablet 50 mg, 50 mg, oral, BID **FOLLOWED BY** [START ON 11/23/2024] lamoTRIgine (LaMICtal) tablet 75 mg, 75 mg, oral, BID **FOLLOWED BY** [START ON 11/30/2024] lamoTRIgine (LaMICtal) tablet 100 mg, 100 mg, oral, BID, Anna  "ERICK Chavez MD    ondansetron (Zofran) injection 4 mg, 4 mg, intravenous, q6h PRN, Elodia Whitman, APRN-CNP, DNP    Laboratory Findings:  Lab Results   Component Value Date    WBC 10.5 10/19/2024    HGB 14.1 10/19/2024    HCT 42.2 10/19/2024    MCV 85 10/19/2024     10/19/2024     No results found for: \"INR\", \"PROTIME\"  Lab Results   Component Value Date    GLUCOSE 93 10/18/2024    CALCIUM 8.4 (L) 10/18/2024     10/18/2024    K 3.7 10/18/2024    CO2 25 10/18/2024     10/18/2024    BUN 11 10/18/2024    CREATININE 1.03 10/18/2024       Assessment and Plan:     Breakthrough seizures -secondary to noncompliance, antiepileptics restarted, modified per neurology recommendations.  Noncompliance -strongly encouraged to be compliant with medications.  Risk of morbidity and mortality explained.  Leukocytosis -likely secondary to the above, no fever.  Lactic acidosis -secondary to seizures.  Resolved.     Discharge home today.      Armani Pearson MD  10/19/24  2:32 PM         "

## 2024-10-19 NOTE — CARE PLAN
Over the shift, the patient did not make progress toward the following goals. Barriers to progression include . Recommendations to address these barriers include .      Problem: Pain - Adult  Goal: Verbalizes/displays adequate comfort level or baseline comfort level  Outcome: Progressing     Problem: Safety - Adult  Goal: Free from fall injury  Outcome: Progressing     Problem: Discharge Planning  Goal: Discharge to home or other facility with appropriate resources  Outcome: Progressing     Problem: Chronic Conditions and Co-morbidities  Goal: Patient's chronic conditions and co-morbidity symptoms are monitored and maintained or improved  Outcome: Progressing     Problem: Fall/Injury  Goal: Verbalize understanding of risk factor reduction measures to prevent injury from fall in the home  Outcome: Progressing  Goal: Use assistive devices by end of the shift  Outcome: Progressing  Goal: Pace activities to prevent fatigue by end of the shift  Outcome: Progressing     Problem: Fall/Injury  Goal: Not fall by end of shift  Outcome: Met  Goal: Be free from injury by end of the shift  Outcome: Met  Goal: Verbalize understanding of personal risk factors for fall in the hospital  Outcome: Met

## 2024-10-19 NOTE — NURSING NOTE
Assumed care.  Patient on bed, no distress or discomfort noted.  Padded upper side rails, bed at lowest position and wheels locked.  Call light in reach.

## 2024-10-19 NOTE — CARE PLAN
The patient's goals for the shift include      The clinical goals for the shift include maintain seizure safety      Problem: Pain - Adult  Goal: Verbalizes/displays adequate comfort level or baseline comfort level  Outcome: Progressing     Problem: Safety - Adult  Goal: Free from fall injury  Outcome: Progressing     Problem: Discharge Planning  Goal: Discharge to home or other facility with appropriate resources  Outcome: Progressing     Problem: Chronic Conditions and Co-morbidities  Goal: Patient's chronic conditions and co-morbidity symptoms are monitored and maintained or improved  Outcome: Progressing     Problem: Fall/Injury  Goal: Verbalize understanding of risk factor reduction measures to prevent injury from fall in the home  Outcome: Progressing  Goal: Use assistive devices by end of the shift  Outcome: Progressing  Goal: Pace activities to prevent fatigue by end of the shift  Outcome: Progressing

## 2024-10-19 NOTE — DISCHARGE INSTRUCTIONS
Follow-up neurology and PCP office within 4 weeks.    Regular diet    Activity as tolerated.    No driving for at least 3 months or until cleared by neurology.

## 2024-10-20 LAB
BACTERIA BLD CULT: NORMAL
BACTERIA BLD CULT: NORMAL

## 2024-10-21 LAB
BACTERIA BLD CULT: NORMAL
BACTERIA BLD CULT: NORMAL

## 2024-10-21 NOTE — DISCHARGE SUMMARY
Discharge Diagnosis  Status epilepticus (Multi)    Issues Requiring Follow-Up      Discharge Meds     Medication List      START taking these medications     brivaracetam 50 mg tablet tablet; Commonly known as: Briviact; Take 1   tablet (50 mg) by mouth every 12 hours.   lamoTRIgine 25 mg tablet; Commonly known as: LaMICtal; Take 1 tablet (25   mg) by mouth once daily for 14 days, THEN 1 tablet (25 mg) 2 times a day   for 14 days, THEN 2 tablets (50 mg) 2 times a day for 7 days, THEN 3   tablets (75 mg) 2 times a day for 7 days, THEN 4 tablets (100 mg) 2 times   a day.; Start taking on: October 19, 2024     CONTINUE taking these medications     albuterol 90 mcg/actuation inhaler; Commonly known as: Ventolin HFA;   Inhale 2 puffs every 4 hours if needed for wheezing or shortness of   breath.     STOP taking these medications     levETIRAcetam 500 mg tablet; Commonly known as: Keppra   nicotine 14 mg/24 hr patch; Commonly known as: Nicoderm CQ       Test Results Pending At Discharge  Pending Labs       Order Current Status    Blood Culture Preliminary result    Blood Culture Preliminary result            Hospital Course   Aron Colón is a 59 y.o. male presenting with several seizures yesterday.  Patient has not been on antiepileptics for several months.  He has no primary care physician and states that he gets his prescriptions from the emergency room when he needs it.  He admits to not actively seeking care.  He had several seizures yesterday including a witnessed 1 in the ER.  He was started on Keppra and currently is awake and alert.  He denies headaches or focal weakness.  He has generalized aches and pains related to the seizures.  He was admitted for further treatment.     The patient was encouraged compliance with medications, seen by neuro. He was stable for [place] home with outpatient follow up.     Pertinent Physical Exam At Time of Discharge      Outpatient Follow-Up  No future appointments.    Time and  care for discharge management > 30 minutes.     Jeannette Mcclain, APRN-CNP

## 2025-03-02 ENCOUNTER — HOSPITAL ENCOUNTER (EMERGENCY)
Facility: HOSPITAL | Age: 60
Discharge: HOME | End: 2025-03-02
Attending: STUDENT IN AN ORGANIZED HEALTH CARE EDUCATION/TRAINING PROGRAM
Payer: MEDICAID

## 2025-03-02 ENCOUNTER — APPOINTMENT (OUTPATIENT)
Dept: CARDIOLOGY | Facility: HOSPITAL | Age: 60
End: 2025-03-02
Payer: MEDICAID

## 2025-03-02 VITALS
RESPIRATION RATE: 25 BRPM | OXYGEN SATURATION: 97 % | BODY MASS INDEX: 22.05 KG/M2 | TEMPERATURE: 97.9 F | HEART RATE: 62 BPM | WEIGHT: 154 LBS | DIASTOLIC BLOOD PRESSURE: 77 MMHG | SYSTOLIC BLOOD PRESSURE: 132 MMHG | HEIGHT: 70 IN

## 2025-03-02 DIAGNOSIS — G40.919 BREAKTHROUGH SEIZURE (MULTI): Primary | ICD-10-CM

## 2025-03-02 DIAGNOSIS — Z87.898 HISTORY OF SEIZURE: ICD-10-CM

## 2025-03-02 LAB
ALBUMIN SERPL BCP-MCNC: 4.3 G/DL (ref 3.4–5)
ALP SERPL-CCNC: 91 U/L (ref 33–120)
ALT SERPL W P-5'-P-CCNC: 12 U/L (ref 10–52)
AMPHETAMINES UR QL SCN: ABNORMAL
ANION GAP SERPL CALCULATED.3IONS-SCNC: 10 MMOL/L (ref 10–20)
APPEARANCE UR: CLEAR
AST SERPL W P-5'-P-CCNC: 13 U/L (ref 9–39)
BARBITURATES UR QL SCN: ABNORMAL
BASOPHILS # BLD AUTO: 0.06 X10*3/UL (ref 0–0.1)
BASOPHILS NFR BLD AUTO: 0.8 %
BENZODIAZ UR QL SCN: ABNORMAL
BILIRUB SERPL-MCNC: 0.4 MG/DL (ref 0–1.2)
BILIRUB UR STRIP.AUTO-MCNC: NEGATIVE MG/DL
BUN SERPL-MCNC: 9 MG/DL (ref 6–23)
BZE UR QL SCN: ABNORMAL
CALCIUM SERPL-MCNC: 9.4 MG/DL (ref 8.6–10.3)
CANNABINOIDS UR QL SCN: ABNORMAL
CHLORIDE SERPL-SCNC: 103 MMOL/L (ref 98–107)
CO2 SERPL-SCNC: 28 MMOL/L (ref 21–32)
COLOR UR: ABNORMAL
CREAT SERPL-MCNC: 0.97 MG/DL (ref 0.5–1.3)
EGFRCR SERPLBLD CKD-EPI 2021: 90 ML/MIN/1.73M*2
EOSINOPHIL # BLD AUTO: 0.06 X10*3/UL (ref 0–0.7)
EOSINOPHIL NFR BLD AUTO: 0.8 %
ERYTHROCYTE [DISTWIDTH] IN BLOOD BY AUTOMATED COUNT: 15 % (ref 11.5–14.5)
ETHANOL SERPL-MCNC: <10 MG/DL
FENTANYL+NORFENTANYL UR QL SCN: ABNORMAL
GLUCOSE SERPL-MCNC: 132 MG/DL (ref 74–99)
GLUCOSE UR STRIP.AUTO-MCNC: NORMAL MG/DL
HCT VFR BLD AUTO: 46 % (ref 41–52)
HGB BLD-MCNC: 15.4 G/DL (ref 13.5–17.5)
IMM GRANULOCYTES # BLD AUTO: 0.03 X10*3/UL (ref 0–0.7)
IMM GRANULOCYTES NFR BLD AUTO: 0.4 % (ref 0–0.9)
KETONES UR STRIP.AUTO-MCNC: NEGATIVE MG/DL
LEUKOCYTE ESTERASE UR QL STRIP.AUTO: NEGATIVE
LYMPHOCYTES # BLD AUTO: 1.68 X10*3/UL (ref 1.2–4.8)
LYMPHOCYTES NFR BLD AUTO: 21.7 %
MCH RBC QN AUTO: 28.8 PG (ref 26–34)
MCHC RBC AUTO-ENTMCNC: 33.5 G/DL (ref 32–36)
MCV RBC AUTO: 86 FL (ref 80–100)
METHADONE UR QL SCN: ABNORMAL
MONOCYTES # BLD AUTO: 0.4 X10*3/UL (ref 0.1–1)
MONOCYTES NFR BLD AUTO: 5.2 %
MUCOUS THREADS #/AREA URNS AUTO: ABNORMAL /LPF
NEUTROPHILS # BLD AUTO: 5.51 X10*3/UL (ref 1.2–7.7)
NEUTROPHILS NFR BLD AUTO: 71.1 %
NITRITE UR QL STRIP.AUTO: NEGATIVE
NRBC BLD-RTO: 0 /100 WBCS (ref 0–0)
OPIATES UR QL SCN: ABNORMAL
OXYCODONE+OXYMORPHONE UR QL SCN: ABNORMAL
PCP UR QL SCN: ABNORMAL
PH UR STRIP.AUTO: 7 [PH]
PLATELET # BLD AUTO: 237 X10*3/UL (ref 150–450)
POTASSIUM SERPL-SCNC: 4 MMOL/L (ref 3.5–5.3)
PROT SERPL-MCNC: 6.7 G/DL (ref 6.4–8.2)
PROT UR STRIP.AUTO-MCNC: NEGATIVE MG/DL
RBC # BLD AUTO: 5.35 X10*6/UL (ref 4.5–5.9)
RBC # UR STRIP.AUTO: ABNORMAL MG/DL
RBC #/AREA URNS AUTO: ABNORMAL /HPF
SODIUM SERPL-SCNC: 137 MMOL/L (ref 136–145)
SP GR UR STRIP.AUTO: 1.02
UROBILINOGEN UR STRIP.AUTO-MCNC: NORMAL MG/DL
WBC # BLD AUTO: 7.7 X10*3/UL (ref 4.4–11.3)
WBC #/AREA URNS AUTO: ABNORMAL /HPF

## 2025-03-02 PROCEDURE — 85025 COMPLETE CBC W/AUTO DIFF WBC: CPT | Performed by: STUDENT IN AN ORGANIZED HEALTH CARE EDUCATION/TRAINING PROGRAM

## 2025-03-02 PROCEDURE — 96365 THER/PROPH/DIAG IV INF INIT: CPT

## 2025-03-02 PROCEDURE — 81001 URINALYSIS AUTO W/SCOPE: CPT | Performed by: STUDENT IN AN ORGANIZED HEALTH CARE EDUCATION/TRAINING PROGRAM

## 2025-03-02 PROCEDURE — 82077 ASSAY SPEC XCP UR&BREATH IA: CPT | Performed by: STUDENT IN AN ORGANIZED HEALTH CARE EDUCATION/TRAINING PROGRAM

## 2025-03-02 PROCEDURE — 80175 DRUG SCREEN QUAN LAMOTRIGINE: CPT | Mod: WESLAB | Performed by: STUDENT IN AN ORGANIZED HEALTH CARE EDUCATION/TRAINING PROGRAM

## 2025-03-02 PROCEDURE — 36415 COLL VENOUS BLD VENIPUNCTURE: CPT | Performed by: STUDENT IN AN ORGANIZED HEALTH CARE EDUCATION/TRAINING PROGRAM

## 2025-03-02 PROCEDURE — 80053 COMPREHEN METABOLIC PANEL: CPT | Performed by: STUDENT IN AN ORGANIZED HEALTH CARE EDUCATION/TRAINING PROGRAM

## 2025-03-02 PROCEDURE — 99284 EMERGENCY DEPT VISIT MOD MDM: CPT | Performed by: STUDENT IN AN ORGANIZED HEALTH CARE EDUCATION/TRAINING PROGRAM

## 2025-03-02 PROCEDURE — 93005 ELECTROCARDIOGRAM TRACING: CPT

## 2025-03-02 PROCEDURE — 2500000001 HC RX 250 WO HCPCS SELF ADMINISTERED DRUGS (ALT 637 FOR MEDICARE OP): Performed by: CLINICAL NURSE SPECIALIST

## 2025-03-02 PROCEDURE — 80307 DRUG TEST PRSMV CHEM ANLYZR: CPT | Performed by: STUDENT IN AN ORGANIZED HEALTH CARE EDUCATION/TRAINING PROGRAM

## 2025-03-02 PROCEDURE — 2500000004 HC RX 250 GENERAL PHARMACY W/ HCPCS (ALT 636 FOR OP/ED): Performed by: CLINICAL NURSE SPECIALIST

## 2025-03-02 RX ORDER — LAMOTRIGINE 25 MG/1
25 TABLET ORAL DAILY
Qty: 14 TABLET | Refills: 0 | Status: SHIPPED | OUTPATIENT
Start: 2025-03-02 | End: 2025-03-02

## 2025-03-02 RX ORDER — LAMOTRIGINE 25 MG/1
25 TABLET ORAL ONCE
Status: COMPLETED | OUTPATIENT
Start: 2025-03-02 | End: 2025-03-02

## 2025-03-02 RX ORDER — LAMOTRIGINE 25 MG/1
25 TABLET ORAL DAILY
Qty: 14 TABLET | Refills: 0 | Status: SHIPPED | OUTPATIENT
Start: 2025-03-02 | End: 2025-03-16

## 2025-03-02 RX ADMIN — SODIUM CHLORIDE 1000 ML: 9 INJECTION, SOLUTION INTRAVENOUS at 16:16

## 2025-03-02 RX ADMIN — LAMOTRIGINE 25 MG: 25 TABLET ORAL at 16:44

## 2025-03-02 RX ADMIN — BRIVARACETAM 100 MG: 50 INJECTION, SUSPENSION INTRAVENOUS at 16:09

## 2025-03-02 ASSESSMENT — LIFESTYLE VARIABLES
HAVE PEOPLE ANNOYED YOU BY CRITICIZING YOUR DRINKING: NO
EVER FELT BAD OR GUILTY ABOUT YOUR DRINKING: NO
EVER HAD A DRINK FIRST THING IN THE MORNING TO STEADY YOUR NERVES TO GET RID OF A HANGOVER: NO
HAVE YOU EVER FELT YOU SHOULD CUT DOWN ON YOUR DRINKING: NO
TOTAL SCORE: 0

## 2025-03-02 ASSESSMENT — COLUMBIA-SUICIDE SEVERITY RATING SCALE - C-SSRS
6. HAVE YOU EVER DONE ANYTHING, STARTED TO DO ANYTHING, OR PREPARED TO DO ANYTHING TO END YOUR LIFE?: NO
2. HAVE YOU ACTUALLY HAD ANY THOUGHTS OF KILLING YOURSELF?: NO
1. IN THE PAST MONTH, HAVE YOU WISHED YOU WERE DEAD OR WISHED YOU COULD GO TO SLEEP AND NOT WAKE UP?: NO

## 2025-03-02 ASSESSMENT — PAIN SCALES - GENERAL: PAINLEVEL_OUTOF10: 0 - NO PAIN

## 2025-03-02 ASSESSMENT — PAIN - FUNCTIONAL ASSESSMENT: PAIN_FUNCTIONAL_ASSESSMENT: 0-10

## 2025-03-02 NOTE — ED TRIAGE NOTES
Pt BIBA for a witnessed seizure by family that lasted approx 90 seconds. Pt has a hx of seizures. Pt is currently not on any medication at this time. Pt was postictal for EMS.

## 2025-03-02 NOTE — Clinical Note
Aron Colón was seen and treated in our emergency department on 3/2/2025.  He may return to work on 03/05/2025.  1-3 days   No driving until cleared by primary care physician/neurology team     If you have any questions or concerns, please don't hesitate to call.      Rosalio Enlgish MD

## 2025-03-02 NOTE — ED PROVIDER NOTES
Department of Emergency Medicine   ED  Provider Note  Admit Date/RoomTime: 3/2/2025  2:50 PM  ED Room: [facility]        History of Present Illness:  Chief Complaint   Patient presents with    Seizures         Aron Colón is a 59 y.o. male history of seizures currently Briviact and Lamictal presented today via EMS for witnessed seizure by family lasting about 90 seconds.  Patient was brought in by EMS patient states he is out of one of his medications.  Follow-up tomorrow for reevaluation of his seizure medications.  He denies any fever or chills.  Has an occasional cough and had a cold a couple of days ago reports his symptoms are improving.  Denies any abdominal pain nausea vomiting or diarrhea.  No fall or injury.  Patient states he was taking his medication as directed he is unsure which medications he is out of.  Denies any alcohol use.  No report of loss of bowel or bladder control    Review of Systems:   Pertinent positives and negatives are stated within HPI, all other systems reviewed and are negative.        --------------------------------------------- PAST HISTORY ---------------------------------------------  Past Medical History:  has a past medical history of Seizures (Multi).  Past Surgical History:  has a past surgical history that includes Shoulder surgery (Left).  Social History:  reports that he has been smoking cigarettes. He has never used smokeless tobacco. He reports current drug use. Drug: Marijuana. He reports that he does not drink alcohol.  Family History: family history is not on file.. Unless otherwise noted, family history is non contributory  The patient’s home medications have been reviewed.  Allergies: Patient has no known allergies.        ---------------------------------------------------PHYSICAL EXAM--------------------------------------    GENERAL APPEARANCE: Awake and alert.   VITAL SIGNS: As per the nurses' triage record.  Tachycardic  HEENT: Normocephalic, atraumatic.   "No raccoon eyes or gallegos signs noted.  Epistaxis noted.  No bite to the tongue or lip extraocular muscles are intact. Pupils equal round and reactive to light. Conjunctiva are pink. Negative scleral icterus. Mucous membranes are moist. Tongue in the midline. Pharynx was without erythema or exudates, uvula midline  NECK: Soft Nontender and supple, full gross ROM, no meningeal signs.  CHEST: Nontender to palpation. Clear to auscultation bilaterally. No rales, rhonchi, or wheezing.   HEART: S1, S2. Regular rate and rhythm. No murmurs, gallops or rubs.  Strong and equal pulses in the extremities.   ABDOMEN: Soft, nontender, nondistended, positive bowel sounds, no palpable masses.  MUSCULCSKELETAL: The calves are nontender to palpation. Full gross active range of motion. Ambulating on own with no acute difficulties  NEUROLOGICAL: Awake, alert and oriented x 3. Power intact in the upper and lower extremities. Sensation is intact to light touch in the upper and lower extremities.  Pushes and pulls are equal and strong.  NIH 0.  Test of skew negative Van negative  IMMUNOLOGICAL: No lymphatic streaking noted   DERM: No petechiae, rashes, or ecchymoses.          ------------------------- NURSING NOTES AND VITALS REVIEWED ---------------------------  The nursing notes within the ED encounter and vital signs as below have been reviewed by myself  /66   Pulse 64   Temp 36.6 °C (97.9 °F)   Resp (!) 26   Ht 1.778 m (5' 10\")   Wt 69.9 kg (154 lb)   SpO2 95%   BMI 22.10 kg/m²     Oxygen Saturation Interpretation: 96% room air    The cardiac monitor revealed sinus tachycardia with a heart rate in the 110s as interpreted by me. The cardiac monitor was ordered secondary to the patient's heart rate and to monitor the patient for dysrhythmia.       The patient’s available past medical records and past encounters were reviewed.          -----------------------DIAGNOSTIC RESULTS------------------------  LABS:    Labs Reviewed "   CBC WITH AUTO DIFFERENTIAL - Abnormal       Result Value    WBC 7.7      nRBC 0.0      RBC 5.35      Hemoglobin 15.4      Hematocrit 46.0      MCV 86      MCH 28.8      MCHC 33.5      RDW 15.0 (*)     Platelets 237      Neutrophils % 71.1      Immature Granulocytes %, Automated 0.4      Lymphocytes % 21.7      Monocytes % 5.2      Eosinophils % 0.8      Basophils % 0.8      Neutrophils Absolute 5.51      Immature Granulocytes Absolute, Automated 0.03      Lymphocytes Absolute 1.68      Monocytes Absolute 0.40      Eosinophils Absolute 0.06      Basophils Absolute 0.06     COMPREHENSIVE METABOLIC PANEL - Abnormal    Glucose 132 (*)     Sodium 137      Potassium 4.0      Chloride 103      Bicarbonate 28      Anion Gap 10      Urea Nitrogen 9      Creatinine 0.97      eGFR 90      Calcium 9.4      Albumin 4.3      Alkaline Phosphatase 91      Total Protein 6.7      AST 13      Bilirubin, Total 0.4      ALT 12     ALCOHOL - Normal    Alcohol <10     DRUG SCREEN,URINE   URINALYSIS WITH REFLEX CULTURE AND MICROSCOPIC    Narrative:     The following orders were created for panel order Urinalysis with Reflex Culture and Microscopic.  Procedure                               Abnormality         Status                     ---------                               -----------         ------                     Urinalysis with Reflex C...[[id number]]                                                 Extra Urine Gray Tube[[id number]]                                                         Please view results for these tests on the individual orders.   URINALYSIS WITH REFLEX CULTURE AND MICROSCOPIC   EXTRA URINE GRAY TUBE   LAMOTRIGINE       As interpreted by me, the above displayed labs are abnormal. All other labs obtained during this visit were within normal range or not returned as of this dictation.      EKG Interpretation    8881 EKG interpreted by me: Sinus rhythm with first-degree AV block with frequent premature narrow  complexes as well as premature ventricular contraction, ventricular rate 100.  Normal axis.  No significant ST or T wave abnormalities. [ML]           No orders to display           No orders to display           ------------------------------ ED COURSE/MEDICAL DECISION MAKING----------------------  Medical Decision Making:   Exam: A medically appropriate exam performed, outlined above, given the known history and presentation.    History obtained from: Review of medical record nursing notes patient      Social Determinants of Health considered during this visit: Lives at home with family      PAST MEDICAL HISTORY/Chronic Conditions Affecting Care     has a past medical history of Seizures (Multi).       CC/HPI Summary, Social Determinants of health, Records Reviewed, DDx, testing done/not done, ED Course, Reassessment, disposition considerations/shared decision making with patient, consults, disposition:   Presents with breakthrough seizure  Plan   Briviact   Lamictal  EKG  Alcohol  CBC  CMP  Drug screen  Lamictal level  Urine    Medical Decision Making/Differential Diagnosis:  Differentials include not limited to viral illness versus UTI versus electrode abnormality versus anemia versus dehydration versus drug ingestion or alcohol ingestion versus med compliance  Review:   Alcohol level negative  White blood cell count 7.7  Hemoglobin 15.4  Glucose 132  Electrolytes unremarkable  Normal renal function  Lamictal level pending send out  Drug screen pending  Urine pending  Patient presents to the emergency department complaints of breakthrough seizure.  He did receive his seizure medication here in the emergency department.  No further seizure activity noted.  Electrolytes unremarkable.  Normal renal function.  Normal LFTs.  No elevation white blood cell count indicate infection and patient is not anemic.  Lamictal level is pending patient reports he has an appointment tomorrow with his team for his seizure  medications.  Alcohol level negative EKG per attending note first-degree AV block initially tachycardic heart rate has improved received IV fluids.  No signs of sepsis or toxicity.  No report of gastric loss or bleeding.  Patient is not hypotensive tachycardic or febrile at this time.  Based on patient's clinical presentation history and symptoms consistent with breakthrough seizure.  OARRS report reviewed.  Prescription sent to pharmacy notified neurology to help collaborate for follow-up.  Patient and family are amenable to plan amenable to discharge urine pending at this time if urine negative plan for discharge patient seen evaluated with attending physician    CMT for discharge utilized discharge planning as documented respiratory of 26 is actually 18 on my exam  PROCEDURES  Unless otherwise noted below, none      CONSULTS:   None  Consult neurology Dr. Wilkerson    ED Course as of 03/02/25 1747   Sun Mar 02, 2025   1511 EKG interpreted by me: Sinus rhythm with first-degree AV block with frequent premature narrow complexes as well as premature ventricular contraction, ventricular rate 100.  Normal axis.  No significant ST or T wave abnormalities. [ML]   1707 Patient's family is at bedside.  Patient states he does not have an appointment scheduled with neurology or to get his medications adjusted he was told to follow-up from his last ER visit. [TB]   1724 Discussed case with neurology team Dr. Wilkerson discussed patient's recurrent breakthrough seizures medication and need for follow-up with neurology.  Advised she will have her staff contact him to squeeze him in for an earlier appointment [TB]   1746 Pharmacy updated prescription sent to pharmacy [TB]      ED Course User Index  [ML] Rosalio English MD  [TB] Hayley Zarate, APRN-CNP         Diagnoses as of 03/02/25 1747   Breakthrough seizure (Multi)   History of seizure         This patient has remained hemodynamically stable during their ED  course.      Critical Care: none        Counseling:  The emergency provider has spoken with the patient family and discussed today’s results, in addition to providing specific details for the plan of care and counseling regarding the diagnosis and prognosis.  Questions are answered at this time and they are agreeable with the plan.         --------------------------------- IMPRESSION AND DISPOSITION ---------------------------------    IMPRESSION  1. Breakthrough seizure (Multi)    2. History of seizure        DISPOSITION  Disposition: Discharge home  Patient condition is stable improved no further seizures        NOTE: This report was transcribed using voice recognition software. Every effort was made to ensure accuracy; however, inadvertent computerized transcription errors may be present    course.      Critical Care: none        Counseling:  The emergency provider has spoken with the patient family and discussed today’s results, in addition to providing specific details for the plan of care and counseling regarding the diagnosis and prognosis.  Questions are answered at this time and they are agreeable with the plan.         --------------------------------- IMPRESSION AND DISPOSITION ---------------------------------    IMPRESSION  1. Breakthrough seizure (Multi)    2. History of seizure        DISPOSITION  Disposition: Discharge home  Patient condition is stable improved no further seizures        NOTE: This report was transcribed using voice recognition software. Every effort was made to ensure accuracy; however, inadvertent computerized transcription errors may be present      NEWTON Delgado-CNP  03/10/25 2755

## 2025-03-02 NOTE — DISCHARGE INSTRUCTIONS
Continue with your home medications.  Follow-up with primary care physician in 2 days for reevaluation  Follow-up with neurology.  Please call the office to schedule an appointment for reevaluation  Take your seizure medication as directed dose was given in the emergency department of your Lamictal and Briviact   No driving until cleared by primary care physician/neurologist

## 2025-03-02 NOTE — ED PROVIDER NOTES
Patient presents after seizure witnessed by family.  It reportedly lasted for about 90 seconds.  EMS was then called and on their arrival, patient was reportedly postictal.  On arrival to the emergency department, patient states that he is not sure why he is here but believes that he had a seizure.  He states that his last seizure was a few days after [place].  He is able to state that he ran out of one of his 2 seizure medications.  He states that he has an appointment tomorrow with his patient denies drinking any alcohol.  He reports that he did have a cough recently.  On examination, heart and lung sounds are unremarkable.  Strength and sensation equal and intact in bilateral upper and lower extremities.  Patient is awake and alert, answers questions appropriately.    Laboratory studies are unremarkable.  Patient did not have any further seizures in the emergency department.  Patient was given doses of seizure medications which she had missed.  Neurology was contacted to expedite follow-up appointment.  Patient given prescription for 2 weeks for his usual seizure medications.  Return precautions given for any worsening symptoms/additional seizures.    I personally saw the patient and made/approved the management plan and take responsiblity for the patient management.  Parts of this chart were completed with dictation software, please excuse any errors in transcription.     Rosalio English MD  03/02/25 1514

## 2025-03-02 NOTE — Clinical Note
Aron Colón was seen and treated in our emergency department on 3/2/2025.  He may return to work on 03/05/2025.  1-3 days   No driving until cleared by primary care physician/neurology team     If you have any questions or concerns, please don't hesitate to call.      Rosalio English MD

## 2025-03-03 LAB
HOLD SPECIMEN: NORMAL
LAMOTRIGINE SERPL-MCNC: 3.3 UG/ML (ref 2.5–15)

## 2025-03-05 LAB
ATRIAL RATE: 80 BPM
P AXIS: 80 DEGREES
P OFFSET: 155 MS
P ONSET: 108 MS
PR INTERVAL: 222 MS
Q ONSET: 219 MS
QRS COUNT: 16 BEATS
QRS DURATION: 94 MS
QT INTERVAL: 358 MS
QTC CALCULATION(BAZETT): 461 MS
QTC FREDERICIA: 424 MS
R AXIS: 94 DEGREES
T AXIS: 67 DEGREES
T OFFSET: 398 MS
VENTRICULAR RATE: 100 BPM

## 2025-04-14 ENCOUNTER — APPOINTMENT (OUTPATIENT)
Dept: RADIOLOGY | Facility: HOSPITAL | Age: 60
End: 2025-04-14
Payer: MEDICAID

## 2025-04-14 ENCOUNTER — PHARMACY VISIT (OUTPATIENT)
Dept: PHARMACY | Facility: CLINIC | Age: 60
End: 2025-04-14
Payer: MEDICAID

## 2025-04-14 ENCOUNTER — HOSPITAL ENCOUNTER (EMERGENCY)
Facility: HOSPITAL | Age: 60
Discharge: HOME | End: 2025-04-14
Attending: EMERGENCY MEDICINE
Payer: MEDICAID

## 2025-04-14 ENCOUNTER — APPOINTMENT (OUTPATIENT)
Dept: CARDIOLOGY | Facility: HOSPITAL | Age: 60
End: 2025-04-14
Payer: MEDICAID

## 2025-04-14 VITALS
HEART RATE: 70 BPM | SYSTOLIC BLOOD PRESSURE: 147 MMHG | WEIGHT: 163.58 LBS | DIASTOLIC BLOOD PRESSURE: 75 MMHG | HEIGHT: 68 IN | OXYGEN SATURATION: 92 % | TEMPERATURE: 98.6 F | RESPIRATION RATE: 16 BRPM | BODY MASS INDEX: 24.79 KG/M2

## 2025-04-14 DIAGNOSIS — R91.1 PULMONARY NODULE: ICD-10-CM

## 2025-04-14 DIAGNOSIS — I10 DIASTOLIC HYPERTENSION: ICD-10-CM

## 2025-04-14 DIAGNOSIS — R56.9 SEIZURE (MULTI): Primary | ICD-10-CM

## 2025-04-14 DIAGNOSIS — J18.9 ATYPICAL PNEUMONIA: ICD-10-CM

## 2025-04-14 LAB
ALBUMIN SERPL BCP-MCNC: 3.8 G/DL (ref 3.4–5)
ALP SERPL-CCNC: 81 U/L (ref 33–136)
ALT SERPL W P-5'-P-CCNC: 9 U/L (ref 10–52)
AMPHETAMINES UR QL SCN: ABNORMAL
ANION GAP SERPL CALCULATED.3IONS-SCNC: 13 MMOL/L (ref 10–20)
APAP SERPL-MCNC: <10 UG/ML
APPEARANCE UR: CLEAR
AST SERPL W P-5'-P-CCNC: 13 U/L (ref 9–39)
BARBITURATES UR QL SCN: ABNORMAL
BASOPHILS # BLD AUTO: 0.07 X10*3/UL (ref 0–0.1)
BASOPHILS NFR BLD AUTO: 0.7 %
BENZODIAZ UR QL SCN: ABNORMAL
BILIRUB SERPL-MCNC: 0.4 MG/DL (ref 0–1.2)
BILIRUB UR STRIP.AUTO-MCNC: NEGATIVE MG/DL
BUN SERPL-MCNC: 12 MG/DL (ref 6–23)
BZE UR QL SCN: ABNORMAL
CALCIUM SERPL-MCNC: 9.4 MG/DL (ref 8.6–10.3)
CANNABINOIDS UR QL SCN: ABNORMAL
CARDIAC TROPONIN I PNL SERPL HS: 5 NG/L (ref 0–20)
CARDIAC TROPONIN I PNL SERPL HS: 5 NG/L (ref 0–20)
CHLORIDE SERPL-SCNC: 105 MMOL/L (ref 98–107)
CO2 SERPL-SCNC: 24 MMOL/L (ref 21–32)
COLOR UR: NORMAL
CREAT SERPL-MCNC: 1.02 MG/DL (ref 0.5–1.3)
EGFRCR SERPLBLD CKD-EPI 2021: 84 ML/MIN/1.73M*2
EOSINOPHIL # BLD AUTO: 0.45 X10*3/UL (ref 0–0.7)
EOSINOPHIL NFR BLD AUTO: 4.3 %
ERYTHROCYTE [DISTWIDTH] IN BLOOD BY AUTOMATED COUNT: 15.6 % (ref 11.5–14.5)
ETHANOL SERPL-MCNC: <10 MG/DL
FENTANYL+NORFENTANYL UR QL SCN: ABNORMAL
GLUCOSE BLD MANUAL STRIP-MCNC: 142 MG/DL (ref 74–99)
GLUCOSE SERPL-MCNC: 139 MG/DL (ref 74–99)
GLUCOSE UR STRIP.AUTO-MCNC: NORMAL MG/DL
HCT VFR BLD AUTO: 46.7 % (ref 41–52)
HGB BLD-MCNC: 15.6 G/DL (ref 13.5–17.5)
HOLD SPECIMEN: NORMAL
IMM GRANULOCYTES # BLD AUTO: 0.04 X10*3/UL (ref 0–0.7)
IMM GRANULOCYTES NFR BLD AUTO: 0.4 % (ref 0–0.9)
KETONES UR STRIP.AUTO-MCNC: NEGATIVE MG/DL
LEUKOCYTE ESTERASE UR QL STRIP.AUTO: NEGATIVE
LYMPHOCYTES # BLD AUTO: 3.02 X10*3/UL (ref 1.2–4.8)
LYMPHOCYTES NFR BLD AUTO: 29.2 %
MCH RBC QN AUTO: 29.4 PG (ref 26–34)
MCHC RBC AUTO-ENTMCNC: 33.4 G/DL (ref 32–36)
MCV RBC AUTO: 88 FL (ref 80–100)
METHADONE UR QL SCN: ABNORMAL
MONOCYTES # BLD AUTO: 0.81 X10*3/UL (ref 0.1–1)
MONOCYTES NFR BLD AUTO: 7.8 %
NEUTROPHILS # BLD AUTO: 5.97 X10*3/UL (ref 1.2–7.7)
NEUTROPHILS NFR BLD AUTO: 57.6 %
NITRITE UR QL STRIP.AUTO: NEGATIVE
NRBC BLD-RTO: 0 /100 WBCS (ref 0–0)
OPIATES UR QL SCN: ABNORMAL
OXYCODONE+OXYMORPHONE UR QL SCN: ABNORMAL
PCP UR QL SCN: ABNORMAL
PH UR STRIP.AUTO: 7 [PH]
PLATELET # BLD AUTO: 261 X10*3/UL (ref 150–450)
POTASSIUM SERPL-SCNC: 3.8 MMOL/L (ref 3.5–5.3)
PROLACTIN SERPL-MCNC: 23.1 UG/L (ref 2–18)
PROT SERPL-MCNC: 6.4 G/DL (ref 6.4–8.2)
PROT UR STRIP.AUTO-MCNC: NEGATIVE MG/DL
RBC # BLD AUTO: 5.3 X10*6/UL (ref 4.5–5.9)
RBC # UR STRIP.AUTO: NEGATIVE MG/DL
SALICYLATES SERPL-MCNC: <3 MG/DL
SODIUM SERPL-SCNC: 138 MMOL/L (ref 136–145)
SP GR UR STRIP.AUTO: 1.01
UROBILINOGEN UR STRIP.AUTO-MCNC: NORMAL MG/DL
WBC # BLD AUTO: 10.4 X10*3/UL (ref 4.4–11.3)

## 2025-04-14 PROCEDURE — 96366 THER/PROPH/DIAG IV INF ADDON: CPT

## 2025-04-14 PROCEDURE — 80307 DRUG TEST PRSMV CHEM ANLYZR: CPT | Performed by: EMERGENCY MEDICINE

## 2025-04-14 PROCEDURE — 71045 X-RAY EXAM CHEST 1 VIEW: CPT

## 2025-04-14 PROCEDURE — 36415 COLL VENOUS BLD VENIPUNCTURE: CPT | Performed by: EMERGENCY MEDICINE

## 2025-04-14 PROCEDURE — 80143 DRUG ASSAY ACETAMINOPHEN: CPT | Performed by: EMERGENCY MEDICINE

## 2025-04-14 PROCEDURE — 71275 CT ANGIOGRAPHY CHEST: CPT

## 2025-04-14 PROCEDURE — 70450 CT HEAD/BRAIN W/O DYE: CPT | Performed by: RADIOLOGY

## 2025-04-14 PROCEDURE — 80053 COMPREHEN METABOLIC PANEL: CPT | Performed by: EMERGENCY MEDICINE

## 2025-04-14 PROCEDURE — 96365 THER/PROPH/DIAG IV INF INIT: CPT | Mod: 59

## 2025-04-14 PROCEDURE — 70450 CT HEAD/BRAIN W/O DYE: CPT

## 2025-04-14 PROCEDURE — RXMED WILLOW AMBULATORY MEDICATION CHARGE

## 2025-04-14 PROCEDURE — 84484 ASSAY OF TROPONIN QUANT: CPT | Performed by: EMERGENCY MEDICINE

## 2025-04-14 PROCEDURE — 93005 ELECTROCARDIOGRAM TRACING: CPT

## 2025-04-14 PROCEDURE — 2500000004 HC RX 250 GENERAL PHARMACY W/ HCPCS (ALT 636 FOR OP/ED): Performed by: EMERGENCY MEDICINE

## 2025-04-14 PROCEDURE — 99285 EMERGENCY DEPT VISIT HI MDM: CPT | Mod: 25 | Performed by: EMERGENCY MEDICINE

## 2025-04-14 PROCEDURE — 82947 ASSAY GLUCOSE BLOOD QUANT: CPT

## 2025-04-14 PROCEDURE — 81003 URINALYSIS AUTO W/O SCOPE: CPT | Performed by: EMERGENCY MEDICINE

## 2025-04-14 PROCEDURE — 85025 COMPLETE CBC W/AUTO DIFF WBC: CPT | Performed by: EMERGENCY MEDICINE

## 2025-04-14 PROCEDURE — 84146 ASSAY OF PROLACTIN: CPT | Mod: WESLAB | Performed by: EMERGENCY MEDICINE

## 2025-04-14 PROCEDURE — 2550000001 HC RX 255 CONTRASTS: Performed by: EMERGENCY MEDICINE

## 2025-04-14 RX ORDER — LEVETIRACETAM 15 MG/ML
1500 INJECTION INTRAVASCULAR ONCE
Status: COMPLETED | OUTPATIENT
Start: 2025-04-14 | End: 2025-04-14

## 2025-04-14 RX ORDER — AZITHROMYCIN 250 MG/1
250 TABLET, FILM COATED ORAL DAILY
Qty: 6 TABLET | Refills: 0 | Status: SHIPPED | OUTPATIENT
Start: 2025-04-14 | End: 2025-04-19

## 2025-04-14 RX ADMIN — LEVETIRACETAM 1500 MG: 15 INJECTION IONTOPHORESIS at 06:37

## 2025-04-14 RX ADMIN — SODIUM CHLORIDE 1000 ML: 9 INJECTION, SOLUTION INTRAVENOUS at 06:32

## 2025-04-14 RX ADMIN — IOHEXOL 50 ML: 350 INJECTION, SOLUTION INTRAVENOUS at 11:14

## 2025-04-14 ASSESSMENT — PAIN - FUNCTIONAL ASSESSMENT: PAIN_FUNCTIONAL_ASSESSMENT: 0-10

## 2025-04-14 ASSESSMENT — PAIN SCALES - GENERAL: PAINLEVEL_OUTOF10: 0 - NO PAIN

## 2025-04-14 NOTE — DISCHARGE INSTRUCTIONS
Follow-up with your primary care physician within 1 to 2 days for further management of your current symptoms.  You will also need to discuss a possible repeat chest x-ray for follow-up on the atypical pneumonia and pulmonary nodule seen on imaging today.      Follow-up with neurology within 2 to 3 days for further management of your breakthrough seizure      Do not drive or operate machinery until you are cleared to do so by your neurologist      Return to the emergency department sooner with worsening of symptoms or onset of new symptoms

## 2025-04-14 NOTE — ED PROVIDER NOTES
HPI   No chief complaint on file.      HPI        Patient History   Past Medical History:   Diagnosis Date    Seizures (Multi)      Past Surgical History:   Procedure Laterality Date    SHOULDER SURGERY Left      No family history on file.  Social History     Tobacco Use    Smoking status: Every Day     Types: Cigarettes    Smokeless tobacco: Never   Substance Use Topics    Alcohol use: Never    Drug use: Yes     Types: Marijuana       Physical Exam   ED Triage Vitals [04/14/25 0628]   Temperature Heart Rate Respirations BP   37 °C (98.6 °F) 73 16 (!) 146/117      Pulse Ox Temp Source Heart Rate Source Patient Position   99 % Tympanic -- --      BP Location FiO2 (%)     -- --       Physical Exam      ED Course & MDM   Diagnoses as of 04/14/25 1602   Seizure (Multi)   Diastolic hypertension   Pulmonary nodule   Atypical pneumonia                 No data recorded     [place] Coma Scale Score: 12 (04/14/25 0629 : Maira Garcia RN)                           Medical Decision Making    The patient is a 60-year-old male presenting to the emergency department for evaluation after he had a seizure at home that was reportedly witnessed by his brother per EMS.  The patient reportedly does have a history of seizures.  He does not know when he last had a seizure.  He does not know what medications he takes for his seizures.  He states he does take medications he does not recall the name of them.  He believes that he has been taking them as prescribed.  He denies any history of alcohol abuse.  He denies any history of illicit substance abuse.  He does not recall what happened before the seizure or during the seizure.  He states he does not have any pain anywhere.  He does not have any headache or visual changes.  No chest pain or shortness of breath.  No abdominal pain.  No nausea vomiting.  No diarrhea or constipation.  No urinary complaints.  No fever or chills.  No cough or congestion.  No focal weakness or numbness.  All  pertinent positives and negatives are recorded above.  All other systems reviewed and otherwise negative.  Vital signs with hypertension but otherwise within normal limits.  Physical exam with a well-nourished well-developed male with disheveled appearance but otherwise in no acute distress.  HEENT exam within normal limits.  He has no evidence of airway compromise or respiratory distress at this time.  Abdominal exam is benign.  He does not have any gross motor, neurologic or vascular deficits on exam.  NIH stroke scale score of 0 at this time.  He is diaphoretic.      EKG with sinus rhythm at 66 bpm, first-degree block, normal axis, normal voltage, normal ST segment, normal T waves      Seizure precautions were maintained while the patient was in the emergency room.      IV fluids and IV Keppra ordered.      Diagnostic labs with evidence of substance abuse but otherwise unremarkable.      Initial troponin 5.  Repeat troponin 5      CT angio chest for pulmonary embolism   Final Result   *No evidence of pulmonary embolism.   *Mild tree-in-bud nodularity in the right lower lobe suggestive   of an infectious or inflammatory process.   *Tiny dependent left pleural effusion with atelectasis.   *Mild upper lung prominent emphysema.   *4 mm right upper lobe pulmonary nodule.   *Coronary artery calcification, a marker for coronary   atherosclerotic disease.   Pulmonary nodule recommendation: No follow-up needed if patient is   low-risk. Non-contrast chest CT can be considered in 12 months if   patient is high-risk (Per Fleischner Society guidelines).   Signed by Jose Gustafson MD      CT head wo IV contrast   Final Result   NO ACUTE INTRACRANIAL PROCESS        MACRO:   None        Signed by: Rosalio Montes 4/14/2025 7:13 AM   Dictation workstation:   [id number]      XR chest 1 view   Final Result   1. Increased retrocardiac opacification, given history of seizure   underlying aspiration is possible. Further evaluation with  CT chest   could be obtained as per clinical need.   2. Stable trace left pleural effusion.             Signed by: Alyse Escobar 4/14/2025 8:00 AM   Dictation workstation:   [id number]            The patient does not have any gross motor, neurologic or vascular episodes on exam.  He is well-perfused on reperfusion exam.  No evidence of sepsis.  Diagnostic labs with evidence of substance abuse but otherwise unremarkable.  No evidence of a STEMI on EKG or cardiac enzymes.  No events on telemetry.  Chest x-ray with increased retrocardiac opacification concerning for possible aspiration per the radiology reading.  He also has a stable left pleural effusion.  CT head without evidence of intracranial hemorrhage, mass effect or CVA.  CT chest tree-in-bud nodularity in the right lower lung and a right upper lobe pulmonary nodule but no evidence of dissection or PE.  No evidence of CHF.  No pneumothorax.      Trial of ambulation showed a pulse ox of at least 95% at all times on room air      The patient was released in good condition.  He will follow-up with his primary care physician within 1 to 2 days for further management of his current symptoms and repeat check of his blood pressure.  He will also follow-up with his neurologist within 1 to 2 days for further management of his breakthrough seizure.  He will return to the emergency department sooner with worsening of symptoms or onset of new symptoms.      Impression/diagnosis  Seizure  Diastolic hypertension  Substance abuse  Atypical pneumonia  Pulmonary nodule, right upper lobe      I independently interpreted the results of the EKG and diagnostic labs      I reviewed the results of the diagnostic labs and diagnostic imaging.  Formal radiology reading was completed by the radiologist    Procedure  Procedures     Mary Oh MD  04/14/25 4370       Mary Oh MD  04/14/25 6212

## 2025-04-15 LAB
ATRIAL RATE: 66 BPM
P AXIS: 76 DEGREES
P OFFSET: 145 MS
P ONSET: 88 MS
PR INTERVAL: 256 MS
Q ONSET: 216 MS
QRS COUNT: 11 BEATS
QRS DURATION: 98 MS
QT INTERVAL: 394 MS
QTC CALCULATION(BAZETT): 413 MS
QTC FREDERICIA: 407 MS
R AXIS: 84 DEGREES
T AXIS: 55 DEGREES
T OFFSET: 413 MS
VENTRICULAR RATE: 66 BPM

## 2025-07-09 ENCOUNTER — HOSPITAL ENCOUNTER (OUTPATIENT)
Dept: RADIOLOGY | Facility: CLINIC | Age: 60
Discharge: HOME | End: 2025-07-09
Payer: MEDICAID

## 2025-07-09 DIAGNOSIS — G40.901 EPILEPSY, UNSPECIFIED, NOT INTRACTABLE, WITH STATUS EPILEPTICUS: ICD-10-CM

## 2025-07-09 PROCEDURE — 70551 MRI BRAIN STEM W/O DYE: CPT

## 2025-07-09 PROCEDURE — 70551 MRI BRAIN STEM W/O DYE: CPT | Performed by: RADIOLOGY

## 2025-07-14 ENCOUNTER — APPOINTMENT (OUTPATIENT)
Dept: NEUROLOGY | Facility: HOSPITAL | Age: 60
End: 2025-07-14
Payer: MEDICAID